# Patient Record
Sex: FEMALE | Race: WHITE | NOT HISPANIC OR LATINO | URBAN - METROPOLITAN AREA
[De-identification: names, ages, dates, MRNs, and addresses within clinical notes are randomized per-mention and may not be internally consistent; named-entity substitution may affect disease eponyms.]

---

## 2023-11-11 ENCOUNTER — EMERGENCY (EMERGENCY)
Facility: HOSPITAL | Age: 88
LOS: 1 days | Discharge: ANOTHER HOSPITAL | End: 2023-11-11
Attending: EMERGENCY MEDICINE
Payer: MEDICARE

## 2023-11-11 VITALS
OXYGEN SATURATION: 96 % | HEIGHT: 65 IN | DIASTOLIC BLOOD PRESSURE: 94 MMHG | TEMPERATURE: 98 F | RESPIRATION RATE: 16 BRPM | HEART RATE: 82 BPM | SYSTOLIC BLOOD PRESSURE: 215 MMHG | WEIGHT: 100.09 LBS

## 2023-11-11 LAB
ACETONE SERPL-MCNC: NEGATIVE — SIGNIFICANT CHANGE UP
ACETONE SERPL-MCNC: NEGATIVE — SIGNIFICANT CHANGE UP
ALBUMIN SERPL ELPH-MCNC: 3.1 G/DL — LOW (ref 3.5–5)
ALBUMIN SERPL ELPH-MCNC: 3.1 G/DL — LOW (ref 3.5–5)
ALP SERPL-CCNC: 84 U/L — SIGNIFICANT CHANGE UP (ref 40–120)
ALP SERPL-CCNC: 84 U/L — SIGNIFICANT CHANGE UP (ref 40–120)
ALT FLD-CCNC: 21 U/L DA — SIGNIFICANT CHANGE UP (ref 10–60)
ALT FLD-CCNC: 21 U/L DA — SIGNIFICANT CHANGE UP (ref 10–60)
ANION GAP SERPL CALC-SCNC: 5 MMOL/L — SIGNIFICANT CHANGE UP (ref 5–17)
ANION GAP SERPL CALC-SCNC: 5 MMOL/L — SIGNIFICANT CHANGE UP (ref 5–17)
APPEARANCE UR: CLEAR — SIGNIFICANT CHANGE UP
APPEARANCE UR: CLEAR — SIGNIFICANT CHANGE UP
AST SERPL-CCNC: 26 U/L — SIGNIFICANT CHANGE UP (ref 10–40)
AST SERPL-CCNC: 26 U/L — SIGNIFICANT CHANGE UP (ref 10–40)
BASOPHILS # BLD AUTO: 0.04 K/UL — SIGNIFICANT CHANGE UP (ref 0–0.2)
BASOPHILS # BLD AUTO: 0.04 K/UL — SIGNIFICANT CHANGE UP (ref 0–0.2)
BASOPHILS NFR BLD AUTO: 0.6 % — SIGNIFICANT CHANGE UP (ref 0–2)
BASOPHILS NFR BLD AUTO: 0.6 % — SIGNIFICANT CHANGE UP (ref 0–2)
BILIRUB SERPL-MCNC: 0.4 MG/DL — SIGNIFICANT CHANGE UP (ref 0.2–1.2)
BILIRUB SERPL-MCNC: 0.4 MG/DL — SIGNIFICANT CHANGE UP (ref 0.2–1.2)
BILIRUB UR-MCNC: NEGATIVE — SIGNIFICANT CHANGE UP
BILIRUB UR-MCNC: NEGATIVE — SIGNIFICANT CHANGE UP
BUN SERPL-MCNC: 34 MG/DL — HIGH (ref 7–18)
BUN SERPL-MCNC: 34 MG/DL — HIGH (ref 7–18)
CALCIUM SERPL-MCNC: 8.7 MG/DL — SIGNIFICANT CHANGE UP (ref 8.4–10.5)
CALCIUM SERPL-MCNC: 8.7 MG/DL — SIGNIFICANT CHANGE UP (ref 8.4–10.5)
CHLORIDE SERPL-SCNC: 105 MMOL/L — SIGNIFICANT CHANGE UP (ref 96–108)
CHLORIDE SERPL-SCNC: 105 MMOL/L — SIGNIFICANT CHANGE UP (ref 96–108)
CK SERPL-CCNC: 183 U/L — SIGNIFICANT CHANGE UP (ref 21–215)
CK SERPL-CCNC: 183 U/L — SIGNIFICANT CHANGE UP (ref 21–215)
CO2 SERPL-SCNC: 29 MMOL/L — SIGNIFICANT CHANGE UP (ref 22–31)
CO2 SERPL-SCNC: 29 MMOL/L — SIGNIFICANT CHANGE UP (ref 22–31)
COLOR SPEC: YELLOW — SIGNIFICANT CHANGE UP
COLOR SPEC: YELLOW — SIGNIFICANT CHANGE UP
CREAT SERPL-MCNC: 0.96 MG/DL — SIGNIFICANT CHANGE UP (ref 0.5–1.3)
CREAT SERPL-MCNC: 0.96 MG/DL — SIGNIFICANT CHANGE UP (ref 0.5–1.3)
DIFF PNL FLD: NEGATIVE — SIGNIFICANT CHANGE UP
DIFF PNL FLD: NEGATIVE — SIGNIFICANT CHANGE UP
EGFR: 54 ML/MIN/1.73M2 — LOW
EGFR: 54 ML/MIN/1.73M2 — LOW
EOSINOPHIL # BLD AUTO: 0.32 K/UL — SIGNIFICANT CHANGE UP (ref 0–0.5)
EOSINOPHIL # BLD AUTO: 0.32 K/UL — SIGNIFICANT CHANGE UP (ref 0–0.5)
EOSINOPHIL NFR BLD AUTO: 4.7 % — SIGNIFICANT CHANGE UP (ref 0–6)
EOSINOPHIL NFR BLD AUTO: 4.7 % — SIGNIFICANT CHANGE UP (ref 0–6)
GLUCOSE SERPL-MCNC: 105 MG/DL — HIGH (ref 70–99)
GLUCOSE SERPL-MCNC: 105 MG/DL — HIGH (ref 70–99)
GLUCOSE UR QL: NEGATIVE MG/DL — SIGNIFICANT CHANGE UP
GLUCOSE UR QL: NEGATIVE MG/DL — SIGNIFICANT CHANGE UP
HCT VFR BLD CALC: 36.7 % — SIGNIFICANT CHANGE UP (ref 34.5–45)
HCT VFR BLD CALC: 36.7 % — SIGNIFICANT CHANGE UP (ref 34.5–45)
HGB BLD-MCNC: 11.4 G/DL — LOW (ref 11.5–15.5)
HGB BLD-MCNC: 11.4 G/DL — LOW (ref 11.5–15.5)
IMM GRANULOCYTES NFR BLD AUTO: 0.3 % — SIGNIFICANT CHANGE UP (ref 0–0.9)
IMM GRANULOCYTES NFR BLD AUTO: 0.3 % — SIGNIFICANT CHANGE UP (ref 0–0.9)
KETONES UR-MCNC: NEGATIVE MG/DL — SIGNIFICANT CHANGE UP
KETONES UR-MCNC: NEGATIVE MG/DL — SIGNIFICANT CHANGE UP
LEUKOCYTE ESTERASE UR-ACNC: NEGATIVE — SIGNIFICANT CHANGE UP
LEUKOCYTE ESTERASE UR-ACNC: NEGATIVE — SIGNIFICANT CHANGE UP
LYMPHOCYTES # BLD AUTO: 0.9 K/UL — LOW (ref 1–3.3)
LYMPHOCYTES # BLD AUTO: 0.9 K/UL — LOW (ref 1–3.3)
LYMPHOCYTES # BLD AUTO: 13.2 % — SIGNIFICANT CHANGE UP (ref 13–44)
LYMPHOCYTES # BLD AUTO: 13.2 % — SIGNIFICANT CHANGE UP (ref 13–44)
MAGNESIUM SERPL-MCNC: 2.2 MG/DL — SIGNIFICANT CHANGE UP (ref 1.6–2.6)
MAGNESIUM SERPL-MCNC: 2.2 MG/DL — SIGNIFICANT CHANGE UP (ref 1.6–2.6)
MCHC RBC-ENTMCNC: 30.3 PG — SIGNIFICANT CHANGE UP (ref 27–34)
MCHC RBC-ENTMCNC: 30.3 PG — SIGNIFICANT CHANGE UP (ref 27–34)
MCHC RBC-ENTMCNC: 31.1 GM/DL — LOW (ref 32–36)
MCHC RBC-ENTMCNC: 31.1 GM/DL — LOW (ref 32–36)
MCV RBC AUTO: 97.6 FL — SIGNIFICANT CHANGE UP (ref 80–100)
MCV RBC AUTO: 97.6 FL — SIGNIFICANT CHANGE UP (ref 80–100)
MONOCYTES # BLD AUTO: 0.55 K/UL — SIGNIFICANT CHANGE UP (ref 0–0.9)
MONOCYTES # BLD AUTO: 0.55 K/UL — SIGNIFICANT CHANGE UP (ref 0–0.9)
MONOCYTES NFR BLD AUTO: 8.1 % — SIGNIFICANT CHANGE UP (ref 2–14)
MONOCYTES NFR BLD AUTO: 8.1 % — SIGNIFICANT CHANGE UP (ref 2–14)
NEUTROPHILS # BLD AUTO: 4.98 K/UL — SIGNIFICANT CHANGE UP (ref 1.8–7.4)
NEUTROPHILS # BLD AUTO: 4.98 K/UL — SIGNIFICANT CHANGE UP (ref 1.8–7.4)
NEUTROPHILS NFR BLD AUTO: 73.1 % — SIGNIFICANT CHANGE UP (ref 43–77)
NEUTROPHILS NFR BLD AUTO: 73.1 % — SIGNIFICANT CHANGE UP (ref 43–77)
NITRITE UR-MCNC: NEGATIVE — SIGNIFICANT CHANGE UP
NITRITE UR-MCNC: NEGATIVE — SIGNIFICANT CHANGE UP
NRBC # BLD: 0 /100 WBCS — SIGNIFICANT CHANGE UP (ref 0–0)
NRBC # BLD: 0 /100 WBCS — SIGNIFICANT CHANGE UP (ref 0–0)
NT-PROBNP SERPL-SCNC: 1923 PG/ML — HIGH (ref 0–450)
NT-PROBNP SERPL-SCNC: 1923 PG/ML — HIGH (ref 0–450)
OSMOLALITY SERPL: 307 MOSMOL/KG — HIGH (ref 280–301)
OSMOLALITY SERPL: 307 MOSMOL/KG — HIGH (ref 280–301)
PH UR: 6.5 — SIGNIFICANT CHANGE UP (ref 5–8)
PH UR: 6.5 — SIGNIFICANT CHANGE UP (ref 5–8)
PLATELET # BLD AUTO: 245 K/UL — SIGNIFICANT CHANGE UP (ref 150–400)
PLATELET # BLD AUTO: 245 K/UL — SIGNIFICANT CHANGE UP (ref 150–400)
POTASSIUM SERPL-MCNC: 4.3 MMOL/L — SIGNIFICANT CHANGE UP (ref 3.5–5.3)
POTASSIUM SERPL-MCNC: 4.3 MMOL/L — SIGNIFICANT CHANGE UP (ref 3.5–5.3)
POTASSIUM SERPL-SCNC: 4.3 MMOL/L — SIGNIFICANT CHANGE UP (ref 3.5–5.3)
POTASSIUM SERPL-SCNC: 4.3 MMOL/L — SIGNIFICANT CHANGE UP (ref 3.5–5.3)
PROT SERPL-MCNC: 8.3 G/DL — SIGNIFICANT CHANGE UP (ref 6–8.3)
PROT SERPL-MCNC: 8.3 G/DL — SIGNIFICANT CHANGE UP (ref 6–8.3)
PROT UR-MCNC: NEGATIVE MG/DL — SIGNIFICANT CHANGE UP
PROT UR-MCNC: NEGATIVE MG/DL — SIGNIFICANT CHANGE UP
RBC # BLD: 3.76 M/UL — LOW (ref 3.8–5.2)
RBC # BLD: 3.76 M/UL — LOW (ref 3.8–5.2)
RBC # FLD: 12.9 % — SIGNIFICANT CHANGE UP (ref 10.3–14.5)
RBC # FLD: 12.9 % — SIGNIFICANT CHANGE UP (ref 10.3–14.5)
SODIUM SERPL-SCNC: 139 MMOL/L — SIGNIFICANT CHANGE UP (ref 135–145)
SODIUM SERPL-SCNC: 139 MMOL/L — SIGNIFICANT CHANGE UP (ref 135–145)
SP GR SPEC: 1.01 — SIGNIFICANT CHANGE UP (ref 1–1.03)
SP GR SPEC: 1.01 — SIGNIFICANT CHANGE UP (ref 1–1.03)
TROPONIN I, HIGH SENSITIVITY RESULT: 28.6 NG/L — SIGNIFICANT CHANGE UP
TROPONIN I, HIGH SENSITIVITY RESULT: 28.6 NG/L — SIGNIFICANT CHANGE UP
UROBILINOGEN FLD QL: 0.2 MG/DL — SIGNIFICANT CHANGE UP (ref 0.2–1)
UROBILINOGEN FLD QL: 0.2 MG/DL — SIGNIFICANT CHANGE UP (ref 0.2–1)
WBC # BLD: 6.81 K/UL — SIGNIFICANT CHANGE UP (ref 3.8–10.5)
WBC # BLD: 6.81 K/UL — SIGNIFICANT CHANGE UP (ref 3.8–10.5)
WBC # FLD AUTO: 6.81 K/UL — SIGNIFICANT CHANGE UP (ref 3.8–10.5)
WBC # FLD AUTO: 6.81 K/UL — SIGNIFICANT CHANGE UP (ref 3.8–10.5)

## 2023-11-11 PROCEDURE — 99285 EMERGENCY DEPT VISIT HI MDM: CPT

## 2023-11-11 PROCEDURE — 71045 X-RAY EXAM CHEST 1 VIEW: CPT | Mod: 26

## 2023-11-11 RX ORDER — SODIUM CHLORIDE 9 MG/ML
1000 INJECTION INTRAMUSCULAR; INTRAVENOUS; SUBCUTANEOUS
Refills: 0 | Status: DISCONTINUED | OUTPATIENT
Start: 2023-11-11 | End: 2023-11-15

## 2023-11-11 RX ORDER — ACETAMINOPHEN 500 MG
675 TABLET ORAL ONCE
Refills: 0 | Status: COMPLETED | OUTPATIENT
Start: 2023-11-11 | End: 2023-11-11

## 2023-11-11 RX ADMIN — SODIUM CHLORIDE 150 MILLILITER(S): 9 INJECTION INTRAMUSCULAR; INTRAVENOUS; SUBCUTANEOUS at 19:53

## 2023-11-11 NOTE — ED PROVIDER NOTE - PROGRESS NOTE DETAILS
labs explained to pt & son.  case d/w Dr. Funes, if CT shows no fracture, advised to d/c home with son CT lumbar/bony pelvis pending  S.O. to Dr. GWENDOLYN Gonzalez Gonzalez: ct shows greater trochanteric fx not extending into intertrochanter area. will need mri after confirming with transfer center ortho- bedrest Gonzalez: unsure if MRi is available. will transfer to [facility] to have test performed.

## 2023-11-11 NOTE — ED PROVIDER NOTE - CARE PLAN
Principal Discharge DX:	Syncope  Secondary Diagnosis:	Dehydration  Secondary Diagnosis:	Back pain   1 Principal Discharge DX:	Syncope  Secondary Diagnosis:	Dehydration  Secondary Diagnosis:	Back pain  Secondary Diagnosis:	Hip fracture

## 2023-11-11 NOTE — ED PROVIDER NOTE - OBJECTIVE STATEMENT
Patient is a 89 y/o female with no pertinent PMHx or pSHX pressents to the ED c/o fall. Patient here with son who states that she fell yesterday when she was walking in the stair stein and missed the handgrip and fell on her back side. She crawled to her bed and her  helped her up. She was able to walk afterwords. This morning, per , patient was eating breakfast and sitting at the table when she seemed like she nodded off for a minute or two according to the . Patient woke up but then fell off her chair onto the floor head forward. Patient states hitting her head. She feels pain when sitting up to the left side of her hip and some pain to her lower back. Patient denies neck pain, belly pain, and all other acute complaints. Patient cannot ambulate by herself. Patient denies smoking and allergies.

## 2023-11-11 NOTE — ED ADULT NURSE NOTE - NSFALLHARMRISKINTERV_ED_ALL_ED
Assistance OOB with selected safe patient handling equipment if applicable/Assistance with ambulation/Communicate risk of Fall with Harm to all staff, patient, and family/Encourage patient to sit up slowly, dangle for a short time, stand at bedside before walking/Monitor gait and stability/Orthostatic vital signs/Provide patient with walking aids/Provide visual cue: red socks, yellow wristband, yellow gown, etc/Reinforce activity limits and safety measures with patient and family/Bed in lowest position, wheels locked, appropriate side rails in place/Call bell, personal items and telephone in reach/Instruct patient to call for assistance before getting out of bed/chair/stretcher/Non-slip footwear applied when patient is off stretcher/[place] to call system/Physically safe environment - no spills, clutter or unnecessary equipment/Purposeful Proactive Rounding/Room/bathroom lighting operational, light cord in reach

## 2023-11-11 NOTE — ED ADULT NURSE NOTE - NSFALLRISKFACTORS_ED_ALL_ED
BMI: BMI (kg/m2): 25.2 (10-04-22 @ 17:55)  HbA1c: A1C with Estimated Average Glucose Result: 5.1 % (07-06-22 @ 10:38)    Glucose: POCT Blood Glucose.: 112 mg/dL (10-05-22 @ 20:21)    BP: --  Lipid Panel: Date/Time: 07-06-22 @ 10:38  Cholesterol, Serum: 128  Direct LDL: --  HDL Cholesterol, Serum: 52  Total Cholesterol/HDL Ration Measurement: --  Triglycerides, Serum: 74   Age: 85 years old or older

## 2023-11-11 NOTE — ED PROVIDER NOTE - MUSCULOSKELETAL, MLM
Spine appears normal, range of motion is not limited, no muscle or joint tenderness, mid lower back-tenderness to palp., no deformity

## 2023-11-11 NOTE — ED PROVIDER NOTE - ENMT NEGATIVE STATEMENT, MLM
Addended by: SCOOTER DOMINGO on: 4/21/2017 04:45 PM     Modules accepted: Orders     Ears: no ear pain and no hearing problems. Nose: no nasal congestion and no nasal drainage. Mouth/Throat: no dysphagia, no hoarseness and no throat pain. Neck: no lumps, no pain, no stiffness and no swollen glands.

## 2023-11-11 NOTE — ED PROVIDER NOTE - NSFOLLOWUPINSTRUCTIONS_ED_ALL_ED_FT
Dehydration, Adult  Dehydration is a condition in which there is not enough water or other fluids in the body. This happens when a person loses more fluids than he or she takes in. Important organs, such as the kidneys, brain, and heart, cannot function without a proper amount of fluids. Any loss of fluids from the body can lead to dehydration.    Dehydration can be mild, moderate, or severe. It should be treated right away to prevent it from becoming severe.    What are the causes?  Dehydration may be caused by:  Conditions that cause loss of water or other fluids, such as diarrhea, vomiting, or sweating or urinating a lot.  Not drinking enough fluids, especially when you are ill or doing activities that require a lot of energy.  Other illnesses and conditions, such as fever or infection.  Certain medicines, such as medicines that remove excess fluid from the body (diuretics).  Lack of safe drinking water.  Not being able to get enough water and food.  What increases the risk?  The following factors may make you more likely to develop this condition:  Having a long-term (chronic) illness that has not been treated properly, such as diabetes, heart disease, or kidney disease.  Being 65 years of age or older.  Having a disability.  Living in a place that is high in altitude, where thinner, drier air causes more fluid loss.  Doing exercises that put stress on your body for a long time (endurance sports).  What are the signs or symptoms?  Symptoms of dehydration depend on how severe it is.    Mild or moderate dehydration    Thirst.  Dry lips or dry mouth.  Dizziness or light-headedness, especially when standing up from a seated position.  Muscle cramps.  Dark urine. Urine may be the color of tea.  Less urine or tears produced than usual.  Headache.  Severe dehydration    Changes in skin. Your skin may be cold and clammy, blotchy, or pale. Your skin also may not return to normal after being lightly pinched and released.  Little or no tears, urine, or sweat.  Changes in vital signs, such as rapid breathing and low blood pressure. Your pulse may be weak or may be faster than 100 beats a minute when you are sitting still.  Other changes, such as:  Feeling very thirsty.  Sunken eyes.  Cold hands and feet.  Confusion.  Being very tired (lethargic) or having trouble waking from sleep.  Short-term weight loss.  Loss of consciousness.  How is this diagnosed?  This condition is diagnosed based on your symptoms and a physical exam. You may have blood and urine tests to help confirm the diagnosis.    How is this treated?  Treatment for this condition depends on how severe it is. Treatment should be started right away. Do not wait until dehydration becomes severe. Severe dehydration is an emergency and needs to be treated in a hospital.  Mild or moderate dehydration can be treated at home. You may be asked to:  Drink more fluids.  Drink an oral rehydration solution (ORS). This drink helps restore proper amounts of fluids and salts and minerals in the blood (electrolytes).  Severe dehydration can be treated:  With IV fluids.  By correcting abnormal levels of electrolytes. This is often done by giving electrolytes through a tube that is passed through your nose and into your stomach (nasogastric tube, or NG tube).  By treating the underlying cause of dehydration.  Follow these instructions at home:  Oral rehydration solution    If told by your health care provider, drink an ORS:  Make an ORS by following instructions on the package.  Start by drinking small amounts, about ½ cup (120 mL) every 5–10 minutes.  Slowly increase how much you drink until you have taken the amount recommended by your health care provider.  Eating and drinking          Drink enough clear fluid to keep your urine pale yellow. If you were told to drink an ORS, finish the ORS first and then start slowly drinking other clear fluids. Drink fluids such as:  Water. Do not drink only water. Doing that can lead to hyponatremia, which is having too little salt (sodium) in the body.  Water from ice chips you suck on.  Fruit juice that you have added water to (diluted fruit juice).  Low-calorie sports drinks.  Eat foods that contain a healthy balance of electrolytes, such as bananas, oranges, potatoes, tomatoes, and spinach.  Do not drink alcohol.  Avoid the following:  Drinks that contain a lot of sugar. These include high-calorie sports drinks, fruit juice that is not diluted, and soda.  Caffeine.  Foods that are greasy or contain a lot of fat or sugar.  General instructions    Take over-the-counter and prescription medicines only as told by your health care provider.  Do not take sodium tablets. Doing that can lead to having too much sodium in the body (hypernatremia).  Return to your normal activities as told by your health care provider. Ask your health care provider what activities are safe for you.  Keep all follow-up visits as told by your health care provider. This is important.  Contact a health care provider if:  You have muscle cramps, pain, or discomfort, such as:  Pain in your abdomen and the pain gets worse or stays in one area (localizes).  Stiff neck.  You have a rash.  You are more irritable than usual.  You are sleepier or have a harder time waking than usual.  You feel weak or dizzy.  You feel very thirsty.  Get help right away if you have:  Any symptoms of severe dehydration.  Symptoms of vomiting, such as:  You cannot eat or drink without vomiting.  Vomiting gets worse or does not go away.  Vomit includes blood or green matter (bile).  Symptoms that get worse with treatment.  A fever.  A severe headache.  Problems with urination or bowel movements, such as:  Diarrhea that gets worse or does not go away.  Blood in your stool (feces). This may cause stool to look black and tarry.  Not urinating, or urinating only a small amount of very dark urine, within 6–8 hours.  Trouble breathing.  These symptoms may represent a serious problem that is an emergency. Do not wait to see if the symptoms will go away. Get medical help right away. Call your local emergency services (911 in the [place] Do not drive yourself to the hospital.    Summary  Dehydration is a condition in which there is not enough water or other fluids in the body. This happens when a person loses more fluids than he or she takes in.  Treatment for this condition depends on how severe it is. Treatment should be started right away. Do not wait until dehydration becomes severe.  Drink enough clear fluid to keep your urine pale yellow. If you were told to drink an oral rehydration solution (ORS), finish the ORS first and then start slowly drinking other clear fluids.  Take over-the-counter and prescription medicines only as told by your health care provider.  Get help right away if you have any symptoms of severe dehydration.  This information is not intended to replace advice given to you by your health care provider. Make sure you discuss any questions you have with your health care provider.      Syncope, Adult  Outline of the head showing blood vessels that supply the brain.  Syncope refers to a condition in which a person temporarily loses consciousness. Syncope may also be called fainting or passing out. It is caused by a sudden decrease in blood flow to the brain. This can happen for a variety of reasons.    Most causes of syncope are not dangerous. It can be triggered by things such as needle sticks, seeing blood, pain, or intense emotion. However, syncope can also be a sign of a serious medical problem, such as a heart abnormality. Other causes can include dehydration, migraines, or taking medicines that lower blood pressure. Your health care provider may do tests to find the reason why you are having syncope.    If you faint, get medical help right away. Call your local emergency services (911 in the [place]    Follow these instructions at home:  Pay attention to any changes in your symptoms. Take these actions to stay safe and to help relieve your symptoms:    Knowing when you may be about to faint    Signs that you may be about to faint include:  Feeling dizzy, weak, light-headed, or like the room is spinning.  Feeling nauseous.  Seeing spots or seeing all white or all black in your field of vision.  Having cold, clammy skin or feeling warm and sweaty.  Hearing ringing in the ears (tinnitus).  If you start to feel like you might faint, sit or lie down right away. If sitting, put your head down between your legs. If lying down, raise (elevate) your feet above the level of your heart.  Breathe deeply and steadily. Wait until all the symptoms have passed.  Have someone stay with you until you feel stable.  Medicines    Take over-the-counter and prescription medicines only as told by your health care provider.  If you are taking blood pressure or heart medicine, get up slowly and take several minutes to sit and then stand. This can reduce dizziness and decrease the risk of syncope.  Lifestyle    Do not drive, use machinery, or play sports until your health care provider says it is okay.  Do not drink alcohol.  Do not use any products that contain nicotine or tobacco. These products include cigarettes, chewing tobacco, and vaping devices, such as e-cigarettes. If you need help quitting, ask your health care provider.  Avoid hot tubs and saunas.  General instructions    Talk with your health care provider about your symptoms. You may need to have testing to understand the cause of your syncope.  Drink enough fluid to keep your urine pale yellow.  Avoid prolonged standing. If you must stand for a long time, do movements such as:  Moving your legs.  Crossing your legs.  Flexing and stretching your leg muscles.  Squatting.  Keep all follow-up visits. This is important.  Contact a health care provider if:  You have episodes of near fainting.  Get help right away if:  You faint.  You hit your head or are injured after fainting.  You have any of these symptoms that may indicate trouble with your heart:  Fast or irregular heartbeats (palpitations).  Unusual pain in your chest, abdomen, or back.  Shortness of breath.  You have a seizure.  You have a severe headache.  You are confused.  You have vision problems.  You have severe weakness or trouble walking.  You are bleeding from your mouth or rectum, or you have black or tarry stool.  These symptoms may represent a serious problem that is an emergency. Do not wait to see if your symptoms will go away. Get medical help right away. Call your local emergency services (911 in the [place] Do not drive yourself to the hospital.    Summary  Syncope refers to a condition in which a person temporarily loses consciousness. Syncope may also be called fainting or passing out. It is caused by a sudden decrease in blood flow to the brain.  Signs that you may be about to faint include dizziness, feeling light-headed, feeling nauseous, sudden vision changes, or cold, clammy skin.  Even though most causes of syncope are not dangerous, syncope can be a sign of a serious medical problem. Get help right away if you faint.  If you start to feel like you might faint, sit or lie down right away. If sitting, put your head down between your legs. If lying down, raise (elevate) your feet above the level of your heart.  This information is not intended to replace advice given to you by your health care provider. Make sure you discuss any questions you have with your health care provider.    take Tylenol 2 tabs 4 times a day as needed for pain  ice to area

## 2023-11-11 NOTE — ED ADULT TRIAGE NOTE - CHIEF COMPLAINT QUOTE
s/p fall yesterday landing on buttock c/o pain BIB son from Urgent Care, dizziness from sitting to standing position for few seconds, past out this morning

## 2023-11-11 NOTE — ED ADULT NURSE NOTE - OBJECTIVE STATEMENT
pt is a 89 y.o. female w/  c/o fall due to dizziness, pt is ax4, on room air, ambulatory w/ a rollator, pt takes a baby aspirin everyday, pt denies hitting head and LOC. no other concerns are noted.

## 2023-11-11 NOTE — ED PROVIDER NOTE - CLINICAL SUMMARY MEDICAL DECISION MAKING FREE TEXT BOX
Patient s/p fall slip and fall last night and today had an episode of syncope. Appears to be dehydrated. Will get labs and CT to rule out vertebral fracture or any head injuries and will give IV fluids and reassess. Patient s/p fall slip and fall last night and today had an episode of syncope, this mostly 2/2 dehydration. Pt appears to be dehydrated. Will get labs and CT to rule out vertebral fracture or any head injuries.  Pt unlikely with hip fracture, no deformity, will give IV fluids and reassess.

## 2023-11-11 NOTE — ED PROVIDER NOTE - HEME/LYMPH NEGATIVE STATEMENT, MLM
Review of Systems   Constitutional: Positive for fever. When she gets migraine   Respiratory: Positive for cough. Gastrointestinal: Positive for abdominal pain and nausea. Abdominal pain in area of surgery   Endocrine: Positive for hot flashes. Genitourinary: Positive for pelvic pain. Musculoskeletal: Positive for back pain. Skin:        Healing abdominal incisions   Neurological: Positive for headaches and seizures. At times   Psychiatric/Behavioral: Positive for sleep disturbance. All other systems reviewed and are negative. 2/20/2020  Staging comments: This was a small area of adenocarcinoma in situ. Cone biopsy revealed no residual disease. All margins clear. 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 Subsequent cold knife cone biopsy revealed no residual disease. All margins negative. Post Operative Changes: Good thus far. Discussed result with patients, and all questions were answered. Plan:    Patient was advised to avoid intercourse for another 2 weeks to allow complete healing. At that time she may resume full activities once again. Follow Up Instructions: 4 months. Continue activity restrictions for another 2 weeks.     Electronically signed by Ronit Herrera MD on 4/10/20 at 10:13 AM EDT no anemia, no easy bruising, no jaundice, no swollen lymph nodes.

## 2023-11-11 NOTE — ED PROVIDER NOTE - MUSCULOSKELETAL [+], MLM
Adderall IR 20mg Pending    Insurance response  Prescription Drug Insurance: [employer]  Notes: Prior authorization submitted - will update provider when decision has been made by insurance.          pain when sitting up to the left side of her hip and some pain to her lower back./BACK PAIN

## 2023-11-12 ENCOUNTER — INPATIENT (INPATIENT)
Facility: HOSPITAL | Age: 88
LOS: 5 days | Discharge: HOME HEALTH | End: 2023-11-18
Attending: HOSPITALIST | Admitting: HOSPITALIST
Payer: MEDICARE

## 2023-11-12 VITALS
HEART RATE: 74 BPM | TEMPERATURE: 99 F | HEIGHT: 65 IN | OXYGEN SATURATION: 97 % | SYSTOLIC BLOOD PRESSURE: 165 MMHG | DIASTOLIC BLOOD PRESSURE: 85 MMHG | RESPIRATION RATE: 18 BRPM

## 2023-11-12 VITALS
SYSTOLIC BLOOD PRESSURE: 143 MMHG | TEMPERATURE: 98 F | OXYGEN SATURATION: 97 % | DIASTOLIC BLOOD PRESSURE: 77 MMHG | HEART RATE: 80 BPM | RESPIRATION RATE: 18 BRPM

## 2023-11-12 DIAGNOSIS — S72.009A FRACTURE OF UNSPECIFIED PART OF NECK OF UNSPECIFIED FEMUR, INITIAL ENCOUNTER FOR CLOSED FRACTURE: ICD-10-CM

## 2023-11-12 PROCEDURE — 73552 X-RAY EXAM OF FEMUR 2/>: CPT | Mod: 26,LT

## 2023-11-12 PROCEDURE — 82962 GLUCOSE BLOOD TEST: CPT

## 2023-11-12 PROCEDURE — 85025 COMPLETE CBC W/AUTO DIFF WBC: CPT

## 2023-11-12 PROCEDURE — 72131 CT LUMBAR SPINE W/O DYE: CPT | Mod: MA

## 2023-11-12 PROCEDURE — 99223 1ST HOSP IP/OBS HIGH 75: CPT

## 2023-11-12 PROCEDURE — 36415 COLL VENOUS BLD VENIPUNCTURE: CPT

## 2023-11-12 PROCEDURE — 99285 EMERGENCY DEPT VISIT HI MDM: CPT | Mod: 25

## 2023-11-12 PROCEDURE — 99285 EMERGENCY DEPT VISIT HI MDM: CPT

## 2023-11-12 PROCEDURE — 83880 ASSAY OF NATRIURETIC PEPTIDE: CPT

## 2023-11-12 PROCEDURE — 81003 URINALYSIS AUTO W/O SCOPE: CPT

## 2023-11-12 PROCEDURE — 83930 ASSAY OF BLOOD OSMOLALITY: CPT

## 2023-11-12 PROCEDURE — 82009 KETONE BODYS QUAL: CPT

## 2023-11-12 PROCEDURE — 71045 X-RAY EXAM CHEST 1 VIEW: CPT

## 2023-11-12 PROCEDURE — 72131 CT LUMBAR SPINE W/O DYE: CPT | Mod: 26,MA

## 2023-11-12 PROCEDURE — 83735 ASSAY OF MAGNESIUM: CPT

## 2023-11-12 PROCEDURE — 72192 CT PELVIS W/O DYE: CPT | Mod: 26,MA

## 2023-11-12 PROCEDURE — 84484 ASSAY OF TROPONIN QUANT: CPT

## 2023-11-12 PROCEDURE — 96374 THER/PROPH/DIAG INJ IV PUSH: CPT

## 2023-11-12 PROCEDURE — 73721 MRI JNT OF LWR EXTRE W/O DYE: CPT | Mod: 26,LT

## 2023-11-12 PROCEDURE — 72192 CT PELVIS W/O DYE: CPT | Mod: MA

## 2023-11-12 PROCEDURE — 93005 ELECTROCARDIOGRAM TRACING: CPT

## 2023-11-12 PROCEDURE — 82550 ASSAY OF CK (CPK): CPT

## 2023-11-12 PROCEDURE — 72195 MRI PELVIS W/O DYE: CPT | Mod: 26,MA

## 2023-11-12 PROCEDURE — 73562 X-RAY EXAM OF KNEE 3: CPT | Mod: 26,LT

## 2023-11-12 PROCEDURE — 99222 1ST HOSP IP/OBS MODERATE 55: CPT | Mod: GC

## 2023-11-12 PROCEDURE — 80053 COMPREHEN METABOLIC PANEL: CPT

## 2023-11-12 PROCEDURE — 73502 X-RAY EXAM HIP UNI 2-3 VIEWS: CPT | Mod: 26,LT

## 2023-11-12 RX ORDER — MIDAZOLAM HYDROCHLORIDE 1 MG/ML
1 INJECTION, SOLUTION INTRAMUSCULAR; INTRAVENOUS ONCE
Refills: 0 | Status: COMPLETED | OUTPATIENT
Start: 2023-11-12 | End: 2023-11-12

## 2023-11-12 RX ORDER — SODIUM CHLORIDE 9 MG/ML
500 INJECTION INTRAMUSCULAR; INTRAVENOUS; SUBCUTANEOUS ONCE
Refills: 0 | Status: COMPLETED | OUTPATIENT
Start: 2023-11-12 | End: 2023-11-12

## 2023-11-12 RX ADMIN — SODIUM CHLORIDE 500 MILLILITER(S): 9 INJECTION INTRAMUSCULAR; INTRAVENOUS; SUBCUTANEOUS at 16:49

## 2023-11-12 RX ADMIN — Medication 270 MILLIGRAM(S): at 00:11

## 2023-11-12 RX ADMIN — Medication 675 MILLIGRAM(S): at 05:04

## 2023-11-12 NOTE — ED ADULT TRIAGE NOTE - PATIENT ON (OXYGEN DELIVERY METHOD)
"Please see \"Imaging\" tab under \"Chart Review\" for details of today's US.    Valentina Perera    " room air

## 2023-11-12 NOTE — CONSULT NOTE ADULT - ASSESSMENT
ASSESSMENT & PLAN  89yFemale w/ L non displaced comminuted GT fx on CT transferred to [facility] to r/o IT extension     PLAN  -NWB LLE, bedrest  -Please obtain XRs: pelvis, L hip, L femur, L knee  -Please obtain MRI pelvis and L hip (be sure MRI is to rule out IT extension)   -Pain control as needed   -hold chemical DVT ppx for OR; SCDs OK  -pain control  -ice/cold compress  -no ortho surgery indicated at this time  -ortho to follow and plan to  finalize pending imaging results  ASSESSMENT & PLAN  89yFemale w/ L non displaced comminuted GT fx on CT transferred to [facility] to r/o IT extension     PLAN  -NWB LLE, bedrest  -Please obtain XRs: pelvis, L hip, L femur, L knee  -Please obtain MRI pelvis and L hip (be sure MRI is to rule out IT extension)   -Pain control as needed   -hold chemical DVT ppx for possible OR; SCDs OK  -pain control  -ice/cold compress  -no ortho surgery indicated at this time  -ortho to follow and plan to  finalize pending imaging results

## 2023-11-12 NOTE — H&P ADULT - HISTORY OF PRESENT ILLNESS
89 yo f with listed h/o htn, thyroid disease . At time of my exam, pt sleeping, arousable, but does not want to participate in h&p. No one answering phone at  numbers listed in chart. Pt speaking lucidly, opening her eyes on command. Per ed/othro , pt sustained left intertrochanteric fracture. Seen by orthopedics here who upon reviewing CT and MR reading, are recommending bone scan in order to determine if pt requires surgery

## 2023-11-12 NOTE — H&P ADULT - NSHPPHYSICALEXAM_GEN_ALL_CORE
PHYSICAL EXAM:      Constitutional: NAD, well-groomed, well-developed  HEENT:  EOMI, Normal Hearing  Neck: No LAD, No JVD  Back: Normal spine flexure, No CVA tenderness  Respiratory: CTAB  Cardiovascular: S1 and S2, RRR, no M/G/R  Gastrointestinal: BS+, soft, NT/ND  Extremities: No peripheral edema  Vascular: 2+ peripheral pulses  Neurological: pt sleeping, arousable, but does not want to participate in h&p.Pt speaking lucidly, opening her eyes on command.

## 2023-11-12 NOTE — ED PROVIDER NOTE - CLINICAL SUMMARY MEDICAL DECISION MAKING FREE TEXT BOX
Mariel Christopher is a 89 y.o. F PMHx significant for HTN, hyperthyroid, takes daily baby aspirin, no other medications, who presents to the ED as a transfer [facility] on 11/10/23 for MRI.

## 2023-11-12 NOTE — H&P ADULT - NSHPLABSRESULTS_GEN_ALL_CORE
11.4   6.81  )-----------( 245      ( 2023 19:45 )             36.7         139  |  105  |  34<H>  ----------------------------<  105<H>  4.3   |  29  |  0.96    Ca    8.7      2023 19:45  Mg     2.2         TPro  8.3  /  Alb  3.1<L>  /  TBili  0.4  /  DBili  x   /  AST  26  /  ALT  21  /  AlkPhos  84      CAPILLARY BLOOD GLUCOSE          Urinalysis Basic - ( 2023 20:37 )    Color: Yellow / Appearance: Clear / S.014 / pH: x  Gluc: x / Ketone: Negative mg/dL  / Bili: Negative / Urobili: 0.2 mg/dL   Blood: x / Protein: Negative mg/dL / Nitrite: Negative   Leuk Esterase: Negative / RBC: x / WBC x   Sq Epi: x / Non Sq Epi: x / Bacteria: x      Vital Signs Last 24 Hrs  T(C): 36.9 (2023 21:35), Max: 37.1 (2023 07:02)  T(F): 98.4 (2023 21:35), Max: 98.7 (2023 07:02)  HR: 83 (2023 21:35) (73 - 109)  BP: 132/57 (2023 21:35) (132/57 - 189/70)  BP(mean): --  RR: 16 (2023 21:35) (15 - 18)  SpO2: 98% (2023 21:35) (96% - 100%)    Parameters below as of 2023 21:35  Patient On (Oxygen Delivery Method): room air

## 2023-11-12 NOTE — ED PROVIDER NOTE - OBJECTIVE STATEMENT
Mariel White is a 89 y.o. F PMHx significant for HTN, hyperthyroid, takes daily baby aspirin, no other medications, who presents to the ED as a transfer from [place] s/p mechanical fall on 11/10/23 for MRI.  Per patient, and son who accompanies her, Friday a.m. she had a mechanical fall after attempting to hold onto a stair rail.  Pt denies feeling lightheaded or dizzy prior to fall, denies LOC or trauma to the head at that time.  Patient was able to bend with assistance at that time.  The following day, as patient continued to ambulate, patient began experiencing pain localized to the lower back.  Per son, while sitting at a table Saturday morning, patient nodded off and fell off the chair onto her .  At that time, per , she was initially confused however, an hour later patient was back to baseline.  2/2 increasing pain with ambulation patient was taken to [place] where CT scans were obtained.  Iv Tylenol given at  for pain relief. Patient was transferred to [facility] for follow-up MRI study 2/2 uncertain intertrochanteric fracture.  At this time patient only complaint is of low back pain. Otherwise, patient denies HA, changes in vision, CP, SOB, ABD pain, changes in urination or BM, or other new myalgias or arthralgias other than lower back pain.    Of note, patient normally ambulates without assistance, she occasionally uses a cane however infrequently. At baseline she is AxOx3 though does intermittently forget details. Pt has ambulated with assistance today.

## 2023-11-12 NOTE — H&P ADULT - PROBLEM SELECTOR PLAN 1
-NWB LLE, bedrest  -Pain control as needed   -pain control (no pain meds given thus far in ed )  -ice/cold compress  -ortho to follow and plan to  finalize pending bone scan results  -npo save for sips of water/meds pending decision for surgery -NWB LLE, bedrest  -Pain control as needed   -pain control (no pain meds given thus far in ed )  -ice/cold compress  -ortho to follow and plan to  finalize pending bone scan results  -npo save for sips of water/meds pending decision for surgery    ADDEND  art 230am pt confused but calm and redirectable; reports "some kind of mistake that Im here". Unable to reach son John at this time; will reattempt later in am

## 2023-11-12 NOTE — ED PROVIDER NOTE - MUSCULOSKELETAL MINIMAL EXAM
tender to palpation over the lower back, No obvious signs of ecchymosis or erythema visible./normal range of motion

## 2023-11-12 NOTE — ED ADULT TRIAGE NOTE - CHIEF COMPLAINT QUOTE
Patient transfer from [place] for MRI of left hip, s/p fall yesterday. CT at [place] showed possible fracture. -head CT at . Arrives with 20G IV to left forearm. Offers no complaints. No hx.

## 2023-11-12 NOTE — ED ADULT NURSE NOTE - OBJECTIVE STATEMENT
Pt received to room 9. Pt A&O x3, slightly confused. ambulatory with assist. Pt c/o s/p fall on Friday. Pt is a transfer from [facility]. Pt had a CT at [facility] that may have shown a fracture. Pt transferred for MRI. Pt denies any pain. Pt denies fever, chills, headache, chest pain, SOB, N/V/D/C, or urinary symptoms. Neuro intact. PERRLA observed. Pt placed on cardiac monitor. Lung sounds clear bilaterally. S1 and S2 sounds noted. Abdomen soft, nontender, and nondistended. Comfort measures provided. Safety maintained.

## 2023-11-12 NOTE — ED PROVIDER NOTE - PROGRESS NOTE DETAILS
Attending MD Ramirez.  Pt signed out to me in stable condition pending L hip MRI, dispo per ortho, 89 yo fem with L hip fx, eval s/p MRI.

## 2023-11-12 NOTE — CONSULT NOTE ADULT - ATTENDING COMMENTS
Patient seen and examined. Mariel White is a 89 year old female who presented to the [facility] Emergency Department for evaluation of her left hip pain. Patient had a mechanical fall on 11/10/2023 after missing a hand railing. She was able to ambulate afterwards. On 11/11/2023, patient had an apparent syncopal episode and fell over onto her . Patient was able to get up and into the chair, but was somewhat disoriented. She was brought to an urgent care and was recommended to present to [facility] for evaluation. CT scan showed a comminuted left greater trochanter fracture. She was then transferred to [facility] for MRI evaluation to rule out IT extension. Orthopedics was consulted and MRI was recommended. However, there was significant motion artifact on the MRI.     Physical Exam:  Motor: Intact EHL/FHL/Tibialis Anterior/Gastrocnemius  Sensory: Intact Superficial Peroneal/Deep Peroneal/Saphenous/Sural/Tibial Nerves  Vascular: 2+ DP Pulse  No tenderness or pain with ROM over the bilateral upper extremities  No tenderness or pain with ROM over the right lower extremity  Mild posterolateral left hip pain with ROM. No groin pain. No pain with heel strike or log roll    Assessment/Plan:  Mariel White is a 89 year old female with a left greater trochanter fracture. Discussed with the patient and her son, the nature of greater trochanter fractures. Discussed the risk of IT extension and the management of IT extension. Discussed that operative management of IT extension would consist of Left Hip Open Reduction, Internal Fixation, Intramedullary Nail. Discussed that if there is no IT extension, then would manage nonoperatively. Discussed following up repeat MRI. Patient and her son understanding and in agreement with the plan.    Plan:  -Repeat MRI to evaluate for IT extension  -Left Lower Extremity: Non-weight bearing

## 2023-11-12 NOTE — CONSULT NOTE ADULT - SUBJECTIVE AND OBJECTIVE BOX
HPI  89y[facility] setting of r/o L IT fx.  Per pt and son she had am mechanical fall on Friday 11/10 from ground level after missing her hand railing.  She was on the ground for a short time and was able to get herself up and continued ambulating w mild discomfort  Saturday 11/11 morning pt had what was described as a potential syncopal episode at breakfast in which she appeared to fall asleep in her chair and then fell over to the floor.  Her son was called by her  and by the time he arrived ~ 1 hour later she was up and in her chair and back to baseline.  She was taken to  where labs were done and it was recommended [facility] to [facility] eval.  Throughout this whole period she was ambulating w mild to moderate reported discomfort  [facility] demonstrated comminuted non displaced L GT fx so pt transferred for MRI to r/o IT extension.  Denies headstrike or LOC. Denies numbness/tingling in the LLE. Denies any other trauma/injuries at this time. At baseline, community ambulator w/o assistive devices and independent in ADLs    ROS  Negative unless otherwise specified in HPI.    PAST MEDICAL & SURGICAL Hx  PAST MEDICAL & SURGICAL HISTORY:  HTN (hypertension)      H/O hyperthyroidism      No significant past surgical history          MEDICATIONS  Home Medications:      ALLERGIES  No Known Allergies      FAMILY Hx  FAMILY HISTORY:      SOCIAL Hx  Social History:      VITALS  Vital Signs Last 24 Hrs  T(C): 36.8 (12 Nov 2023 07:21), Max: 37.1 (12 Nov 2023 07:02)  T(F): 98.2 (12 Nov 2023 07:21), Max: 98.7 (12 Nov 2023 07:02)  HR: 73 (12 Nov 2023 07:21) (73 - 109)  BP: 186/81 (12 Nov 2023 07:21) (143/77 - 215/94)  BP(mean): --  RR: 15 (12 Nov 2023 07:21) (15 - 18)  SpO2: 100% (12 Nov 2023 07:21) (96% - 100%)    Parameters below as of 12 Nov 2023 07:21  Patient On (Oxygen Delivery Method): room air        PHYSICAL EXAM  Gen: Lying in bed, NAD  Resp: No increased WOB  LLE:  Skin intact, equal leg lengths, no edema or ecchymoses over R or L hip  Erythema/scaling of b/l distal LEs possibly venous stasis   +TTP over L hip localized to lateral aspect over GT and slightly posterior over glute, no TTP along remainder of extremity; compartments soft  Full AROM and PROM of the L hip   - Log roll  - pain w axial loading   Motor: TA/EHL/GS/FHL intact  Sensory: DP/SP/Tib/Sandip/Saph SILT  +DP pulse, WWP    Secondary survey:  No TTP along spine or other extremities, pelvis grossly stable, SILT and compartments soft throughout    LABS                        11.4   6.81  )-----------( 245      ( 11 Nov 2023 19:45 )             36.7     11-11    139  |  105  |  34<H>  ----------------------------<  105<H>  4.3   |  29  |  0.96    Ca    8.7      11 Nov 2023 19:45  Mg     2.2     11-11    TPro  8.3  /  Alb  3.1<L>  /  TBili  0.4  /  DBili  x   /  AST  26  /  ALT  21  /  AlkPhos  84  11-11        IMAGING  CT showing L GT comminuted non displaced fx

## 2023-11-12 NOTE — ED ADULT NURSE NOTE - CHIEF COMPLAINT QUOTE
Patient transfer from [place] for MRI of left hip, s/p fall yesterday. CT at [place] showed possible fracture. -head CT at . Arrives with 20G IV to left forearm. Offers no complaints. No hx.

## 2023-11-13 DIAGNOSIS — E07.89 OTHER SPECIFIED DISORDERS OF THYROID: ICD-10-CM

## 2023-11-13 DIAGNOSIS — S72.009A FRACTURE OF UNSPECIFIED PART OF NECK OF UNSPECIFIED FEMUR, INITIAL ENCOUNTER FOR CLOSED FRACTURE: ICD-10-CM

## 2023-11-13 DIAGNOSIS — I10 ESSENTIAL (PRIMARY) HYPERTENSION: ICD-10-CM

## 2023-11-13 DIAGNOSIS — Z29.9 ENCOUNTER FOR PROPHYLACTIC MEASURES, UNSPECIFIED: ICD-10-CM

## 2023-11-13 DIAGNOSIS — Z79.899 OTHER LONG TERM (CURRENT) DRUG THERAPY: ICD-10-CM

## 2023-11-13 LAB
ANION GAP SERPL CALC-SCNC: 8 MMOL/L — SIGNIFICANT CHANGE UP (ref 7–14)
ANION GAP SERPL CALC-SCNC: 8 MMOL/L — SIGNIFICANT CHANGE UP (ref 7–14)
APTT BLD: 22.2 SEC — LOW (ref 24.5–35.6)
APTT BLD: 22.2 SEC — LOW (ref 24.5–35.6)
BLD GP AB SCN SERPL QL: NEGATIVE — SIGNIFICANT CHANGE UP
BLD GP AB SCN SERPL QL: NEGATIVE — SIGNIFICANT CHANGE UP
BUN SERPL-MCNC: 28 MG/DL — HIGH (ref 7–23)
BUN SERPL-MCNC: 28 MG/DL — HIGH (ref 7–23)
CALCIUM SERPL-MCNC: 9 MG/DL — SIGNIFICANT CHANGE UP (ref 8.4–10.5)
CALCIUM SERPL-MCNC: 9 MG/DL — SIGNIFICANT CHANGE UP (ref 8.4–10.5)
CHLORIDE SERPL-SCNC: 107 MMOL/L — SIGNIFICANT CHANGE UP (ref 98–107)
CHLORIDE SERPL-SCNC: 107 MMOL/L — SIGNIFICANT CHANGE UP (ref 98–107)
CO2 SERPL-SCNC: 24 MMOL/L — SIGNIFICANT CHANGE UP (ref 22–31)
CO2 SERPL-SCNC: 24 MMOL/L — SIGNIFICANT CHANGE UP (ref 22–31)
CREAT SERPL-MCNC: 1.01 MG/DL — SIGNIFICANT CHANGE UP (ref 0.5–1.3)
CREAT SERPL-MCNC: 1.01 MG/DL — SIGNIFICANT CHANGE UP (ref 0.5–1.3)
EGFR: 51 ML/MIN/1.73M2 — LOW
EGFR: 51 ML/MIN/1.73M2 — LOW
GLUCOSE SERPL-MCNC: 103 MG/DL — HIGH (ref 70–99)
GLUCOSE SERPL-MCNC: 103 MG/DL — HIGH (ref 70–99)
HCT VFR BLD CALC: 37 % — SIGNIFICANT CHANGE UP (ref 34.5–45)
HCT VFR BLD CALC: 37 % — SIGNIFICANT CHANGE UP (ref 34.5–45)
HGB BLD-MCNC: 11.2 G/DL — LOW (ref 11.5–15.5)
HGB BLD-MCNC: 11.2 G/DL — LOW (ref 11.5–15.5)
INR BLD: 1.24 RATIO — HIGH (ref 0.85–1.18)
INR BLD: 1.24 RATIO — HIGH (ref 0.85–1.18)
MCHC RBC-ENTMCNC: 29.4 PG — SIGNIFICANT CHANGE UP (ref 27–34)
MCHC RBC-ENTMCNC: 29.4 PG — SIGNIFICANT CHANGE UP (ref 27–34)
MCHC RBC-ENTMCNC: 30.3 GM/DL — LOW (ref 32–36)
MCHC RBC-ENTMCNC: 30.3 GM/DL — LOW (ref 32–36)
MCV RBC AUTO: 97.1 FL — SIGNIFICANT CHANGE UP (ref 80–100)
MCV RBC AUTO: 97.1 FL — SIGNIFICANT CHANGE UP (ref 80–100)
NRBC # BLD: 0 /100 WBCS — SIGNIFICANT CHANGE UP (ref 0–0)
NRBC # BLD: 0 /100 WBCS — SIGNIFICANT CHANGE UP (ref 0–0)
NRBC # FLD: 0 K/UL — SIGNIFICANT CHANGE UP (ref 0–0)
NRBC # FLD: 0 K/UL — SIGNIFICANT CHANGE UP (ref 0–0)
PLATELET # BLD AUTO: 172 K/UL — SIGNIFICANT CHANGE UP (ref 150–400)
PLATELET # BLD AUTO: 172 K/UL — SIGNIFICANT CHANGE UP (ref 150–400)
POTASSIUM SERPL-MCNC: 4.1 MMOL/L — SIGNIFICANT CHANGE UP (ref 3.5–5.3)
POTASSIUM SERPL-MCNC: 4.1 MMOL/L — SIGNIFICANT CHANGE UP (ref 3.5–5.3)
POTASSIUM SERPL-SCNC: 4.1 MMOL/L — SIGNIFICANT CHANGE UP (ref 3.5–5.3)
POTASSIUM SERPL-SCNC: 4.1 MMOL/L — SIGNIFICANT CHANGE UP (ref 3.5–5.3)
PROTHROM AB SERPL-ACNC: 13.9 SEC — HIGH (ref 9.5–13)
PROTHROM AB SERPL-ACNC: 13.9 SEC — HIGH (ref 9.5–13)
RBC # BLD: 3.81 M/UL — SIGNIFICANT CHANGE UP (ref 3.8–5.2)
RBC # BLD: 3.81 M/UL — SIGNIFICANT CHANGE UP (ref 3.8–5.2)
RBC # FLD: 13.2 % — SIGNIFICANT CHANGE UP (ref 10.3–14.5)
RBC # FLD: 13.2 % — SIGNIFICANT CHANGE UP (ref 10.3–14.5)
RH IG SCN BLD-IMP: POSITIVE — SIGNIFICANT CHANGE UP
RH IG SCN BLD-IMP: POSITIVE — SIGNIFICANT CHANGE UP
SODIUM SERPL-SCNC: 139 MMOL/L — SIGNIFICANT CHANGE UP (ref 135–145)
SODIUM SERPL-SCNC: 139 MMOL/L — SIGNIFICANT CHANGE UP (ref 135–145)
TSH SERPL-MCNC: 0.43 UIU/ML — SIGNIFICANT CHANGE UP (ref 0.27–4.2)
TSH SERPL-MCNC: 0.43 UIU/ML — SIGNIFICANT CHANGE UP (ref 0.27–4.2)
WBC # BLD: 5.45 K/UL — SIGNIFICANT CHANGE UP (ref 3.8–10.5)
WBC # BLD: 5.45 K/UL — SIGNIFICANT CHANGE UP (ref 3.8–10.5)
WBC # FLD AUTO: 5.45 K/UL — SIGNIFICANT CHANGE UP (ref 3.8–10.5)
WBC # FLD AUTO: 5.45 K/UL — SIGNIFICANT CHANGE UP (ref 3.8–10.5)

## 2023-11-13 PROCEDURE — 99233 SBSQ HOSP IP/OBS HIGH 50: CPT

## 2023-11-13 RX ORDER — KETOROLAC TROMETHAMINE 30 MG/ML
15 SYRINGE (ML) INJECTION EVERY 6 HOURS
Refills: 0 | Status: DISCONTINUED | OUTPATIENT
Start: 2023-11-13 | End: 2023-11-13

## 2023-11-13 RX ORDER — INFLUENZA VIRUS VACCINE 15; 15; 15; 15 UG/.5ML; UG/.5ML; UG/.5ML; UG/.5ML
0.7 SUSPENSION INTRAMUSCULAR ONCE
Refills: 0 | Status: DISCONTINUED | OUTPATIENT
Start: 2023-11-13 | End: 2023-11-18

## 2023-11-13 RX ORDER — HEPARIN SODIUM 5000 [USP'U]/ML
5000 INJECTION INTRAVENOUS; SUBCUTANEOUS EVERY 12 HOURS
Refills: 0 | Status: DISCONTINUED | OUTPATIENT
Start: 2023-11-13 | End: 2023-11-14

## 2023-11-13 RX ORDER — SODIUM CHLORIDE 9 MG/ML
1000 INJECTION INTRAMUSCULAR; INTRAVENOUS; SUBCUTANEOUS
Refills: 0 | Status: DISCONTINUED | OUTPATIENT
Start: 2023-11-13 | End: 2023-11-13

## 2023-11-13 RX ORDER — HEPARIN SODIUM 5000 [USP'U]/ML
5000 INJECTION INTRAVENOUS; SUBCUTANEOUS ONCE
Refills: 0 | Status: COMPLETED | OUTPATIENT
Start: 2023-11-13 | End: 2023-11-13

## 2023-11-13 RX ORDER — CHLORHEXIDINE GLUCONATE 213 G/1000ML
1 SOLUTION TOPICAL DAILY
Refills: 0 | Status: DISCONTINUED | OUTPATIENT
Start: 2023-11-13 | End: 2023-11-18

## 2023-11-13 RX ORDER — ACETAMINOPHEN 500 MG
650 TABLET ORAL EVERY 6 HOURS
Refills: 0 | Status: DISCONTINUED | OUTPATIENT
Start: 2023-11-13 | End: 2023-11-18

## 2023-11-13 RX ORDER — DIAZEPAM 5 MG
2.5 TABLET ORAL ONCE
Refills: 0 | Status: DISCONTINUED | OUTPATIENT
Start: 2023-11-13 | End: 2023-11-13

## 2023-11-13 RX ORDER — AMLODIPINE BESYLATE 2.5 MG/1
5 TABLET ORAL DAILY
Refills: 0 | Status: DISCONTINUED | OUTPATIENT
Start: 2023-11-13 | End: 2023-11-18

## 2023-11-13 RX ADMIN — SODIUM CHLORIDE 50 MILLILITER(S): 9 INJECTION INTRAMUSCULAR; INTRAVENOUS; SUBCUTANEOUS at 02:48

## 2023-11-13 RX ADMIN — HEPARIN SODIUM 5000 UNIT(S): 5000 INJECTION INTRAVENOUS; SUBCUTANEOUS at 02:05

## 2023-11-13 RX ADMIN — HEPARIN SODIUM 5000 UNIT(S): 5000 INJECTION INTRAVENOUS; SUBCUTANEOUS at 18:06

## 2023-11-13 RX ADMIN — AMLODIPINE BESYLATE 5 MILLIGRAM(S): 2.5 TABLET ORAL at 12:42

## 2023-11-13 RX ADMIN — Medication 2.5 MILLIGRAM(S): at 20:01

## 2023-11-13 NOTE — PROGRESS NOTE ADULT - PROBLEM SELECTOR PLAN 2
normotensive initially, now elevated to 170-180's systolic to 80-90s diastolic  not on BP meds at home per son  start norvasc 5 mg qd  cardiology consulted, no further testing prior to planned surgery

## 2023-11-13 NOTE — PATIENT PROFILE ADULT - FALL HARM RISK - HARM RISK INTERVENTIONS
Assistance with ambulation/Assistance OOB with selected safe patient handling equipment/Communicate Risk of Fall with Harm to all staff/Discuss with provider need for PT consult/Monitor gait and stability/Provide patient with walking aids - walker, cane, crutches/Reinforce activity limits and safety measures with patient and family/Review medications for side effects contributing to fall risk/Sit up slowly, dangle for a short time, stand at bedside before walking/Tailored Fall Risk Interventions/Use of alarms - bed, chair and/or voice tab/Visual Cue: Yellow wristband and red socks/Bed in lowest position, wheels locked, appropriate side rails in place/Call bell, personal items and telephone in reach/Instruct patient to call for assistance before getting out of bed or chair/Non-slip footwear when patient is out of bed/[place] to call system/Physically safe environment - no spills, clutter or unnecessary equipment/Purposeful Proactive Rounding/Room/bathroom lighting operational, light cord in reach Assistance with ambulation/Assistance OOB with selected safe patient handling equipment/Communicate Risk of Fall with Harm to all staff/Discuss with provider need for PT consult/Monitor gait and stability/Provide patient with walking aids - walker, cane, crutches/Reinforce activity limits and safety measures with patient and family/Tailored Fall Risk Interventions/Use of alarms - bed, chair and/or voice tab/Visual Cue: Yellow wristband and red socks/Bed in lowest position, wheels locked, appropriate side rails in place/Call bell, personal items and telephone in reach/Instruct patient to call for assistance before getting out of bed or chair/Non-slip footwear when patient is out of bed/[place] to call system/Physically safe environment - no spills, clutter or unnecessary equipment/Purposeful Proactive Rounding/Room/bathroom lighting operational, light cord in reach

## 2023-11-13 NOTE — CONSULT NOTE ADULT - SUBJECTIVE AND OBJECTIVE BOX
MR:- [id number] :  NAME:LAKEISHA JOHNSON:-    DATE OF SERVICE:11-13-23 @ 08:43    Patient was seen,examined and evaluated  by Otf Reyes MD [id number] @ 08:43 .  ER evaluation, Labs and Hospital course was reviewed,    CHIEF COMPLAINT:Fall    HPI 89 y.o. F PMHx significant for HTN, hyperthyroid, takes daily baby aspirin, no other medications, who presents to the ED as a transfer from [place] s/p mechanical fall on 11/10/23 for MRI.    Per patient, and son who accompanies her, Friday a.m. she had a mechanical fall after attempting to hold onto a stair rail.  Pt denies feeling lightheaded or dizzy prior to fall, denies LOC or trauma to the head at that time.  Patient was able to bend with assistance at that time.  The following day, as patient continued to ambulate, patient began experiencing pain localized to the lower back.  Per son, while sitting at a table Saturday morning, patient nodded off and fell off the chair onto her .  At that time, per , she was initially confused however, an hour later patient was back to baseline.  2/2 increasing pain with ambulation patient was taken to [place] where CT scans were obtained.  Iv Tylenol given at  for pain relief. Patient was transferred to [facility] for follow-up MRI study 2/2 uncertain intertrochanteric fracture.    At this time patient only complaint is of low back pain. Otherwise, patient denies HA, changes in vision, CP, SOB, ABD pain            CARDIAC HISTORY:  [ ] CAD [ [PCI [ ] CABG [ ] Prior Cath  [ ] Atrial Fibrillation  Devices[ ] PPM [ ] ICD [ ]ILR  Heart Failure [ ] HFrEF [ ] HFpEF    PAST MEDICAL & SURGICAL HISTORY:  HTN (hypertension)      H/O hyperthyroidism      No significant past surgical history          MEDICATIONS  (STANDING):  sodium chloride 0.9%. 1000 milliLiter(s) (50 mL/Hr) IV Continuous <Continuous>    MEDICATIONS  (PRN):  acetaminophen     Tablet .. 650 milliGRAM(s) Oral every 6 hours PRN Mild Pain (1 - 3), Moderate Pain (4 - 6)  ketorolac   Injectable 15 milliGRAM(s) IV Push every 6 hours PRN Severe Pain (7 - 10)      FAMILY HISTORY:    No family history of premature coronary artery disease or sudden cardiac death    SOCIAL HISTORY:  Smoking-[ ] Active  [ ] Former [ ] Non Smoker  Alcohol-[ ] Denies [ ] Social [ ] Daily  Ilicit Drug use-[ ] Denies [ ] Active user    REVIEW OF SYSTEMS:  Constitutional: [ ] fever, [ ]weight loss, [ ]fatigue   Activity [ ] Bedbound,[ ] Ambulates [ ] Unassisted[ ] Cane/Walker [ ] Assistence.  Effort tolerance:[ ] Excellent [ ] Good [ ] Fair [ ] Poor [ ]  Eyes: [ ] visual changes  Respiratory: [ ]shortness of breath;  [ ] cough, [ ]wheezing, [ ]chills, [ ]hemoptysis  Cardiovascular: [ ] chest pain, [ ]palpitations, [ ]dizziness,  [ ]leg swelling[ ]orthopnea [ ]PND  Gastrointestinal: [ ] abdominal pain, [ ]nausea, [ ]vomiting,  [ ]diarrhea,[ ]constipation  Genitourinary: [ ] dysuria, [ ] hematuria  Neurologic: [ ] headaches [ ] tremors[ ] weakness  Skin: [ ] itching, [ ]burning, [ ] rashes  Endocrine: [ ] heat or cold intolerance  Musculoskeletal: [ ] joint pain or swelling; [ ] muscle, back, or extremity pain  Psychiatric: [ ] depression, [ ]anxiety, [ ]mood swings, or [ ]difficulty sleeping  Hematologic: [ ] easy bruising, [ ] bleeding gums       [ x] All others negative	  [ ] Unable to obtain    Vital Signs Last 24 Hrs  T(C): 36.6 (13 Nov 2023 06:56), Max: 36.9 (12 Nov 2023 21:35)  T(F): 97.9 (13 Nov 2023 06:56), Max: 98.4 (12 Nov 2023 21:35)  HR: 79 (13 Nov 2023 06:56) (75 - 85)  BP: 184/90 (13 Nov 2023 06:56) (132/57 - 189/70)  BP(mean): --  RR: 16 (13 Nov 2023 06:56) (16 - 16)  SpO2: 99% (13 Nov 2023 06:56) (98% - 100%)    Parameters below as of 13 Nov 2023 06:56  Patient On (Oxygen Delivery Method): room air      I&O's Summary      PHYSICAL EXAM:  General: No acute distress BMI-  HEENT: EOMI, PERRL[ ] Icteric  Neck: Supple, [ ] JVD  Lungs: Equal air entry bilaterally; [ ] Rales [ ] Rhonchi [ ] Wheezing  Heart: Regular rate and rhythm;[ ] Murmurs-   /6 [ ] Systolic [ ] Diastolic [ ] Radiation,No rubs, or gallops  Abdomen: Nontender, bowel sounds present  Extremities: No clubbing, cyanosis, [ ] edema[ ] Calf tenderness  Nervous system:  Alert & Oriented X3, no focal deficits  Psychiatric: Normal affect  Skin: No rashes or lesions      LABS:  11-13    139  |  107  |  28<H>  ----------------------------<  103<H>  4.1   |  24  |  1.01    Ca    9.0      13 Nov 2023 02:03  Mg     2.2     11-11    TPro  8.3  /  Alb  3.1<L>  /  TBili  0.4  /  DBili  x   /  AST  26  /  ALT  21  /  AlkPhos  84  11-11    Creatinine Trend: 1.01<--, 0.96<--                        11.2   5.45  )-----------( 172      ( 13 Nov 2023 02:03 )             37.0     PT/INR - ( 13 Nov 2023 02:03 )   PT: 13.9 sec;   INR: 1.24 ratio         PTT - ( 13 Nov 2023 02:03 )  PTT:22.2 sec    Lipid Panel:   Cardiac Enzymes: CARDIAC MARKERS ( 11 Nov 2023 19:45 )  x     / x     / 183 U/L / x     / x                RADIOLOGY:    ECG [my interpretation]:    TELEMETRY:    ECHO:    STRESS TEST:    CATHETERIZATION: MR:- [id number] :  NAME:LAKEISHA JOHNSON:-    DATE OF SERVICE:11-13-23 @ 08:43    Patient was seen,examined and evaluated  by Otf Reyes MD [id number] @ 08:43 .  ER evaluation, Labs and Hospital course was reviewed,    CHIEF COMPLAINT:Fall    HPI 89 y.o. F PMHx significant for HTN, hyperthyroid, takes daily baby aspirin, no other medications, who presents to the ED as a transfer from [place] s/p mechanical fall on 11/10/23 for MRI.    Per patient, and son who accompanies her, Friday a.m. she had a mechanical fall after attempting to hold onto a stair rail.  Pt denies feeling lightheaded or dizzy prior to fall, denies LOC or trauma to the head at that time.  Patient was able to bend with assistance at that time.  The following day, as patient continued to ambulate, patient began experiencing pain localized to the lower back.  Per son, while sitting at a table Saturday morning, patient nodded off and fell off the chair onto her .  At that time, per , she was initially confused however, an hour later patient was back to baseline.  2/2 increasing pain with ambulation patient was taken to [place] where CT scans were obtained.  Iv Tylenol given at  for pain relief. Patient was transferred to [facility] for follow-up MRI study 2/2 uncertain intertrochanteric fracture.    At this time patient only complaint is of low back pain. Otherwise, patient denies HA, changes in vision, CP, SOB, ABD pain      Patient denies chest pain dyspnea no recent cardiac issues      CARDIAC HISTORY:  [ ] CAD [ [PCI [ ] CABG [ ] Prior Cath  [ ] Atrial Fibrillation  Devices[ ] PPM [ ] ICD [ ]ILR  Heart Failure [ ] HFrEF [ ] HFpEF    PAST MEDICAL & SURGICAL HISTORY:  HTN (hypertension)      H/O hyperthyroidism      No significant past surgical history          MEDICATIONS  (STANDING):  sodium chloride 0.9%. 1000 milliLiter(s) (50 mL/Hr) IV Continuous <Continuous>    MEDICATIONS  (PRN):  acetaminophen     Tablet .. 650 milliGRAM(s) Oral every 6 hours PRN Mild Pain (1 - 3), Moderate Pain (4 - 6)  ketorolac   Injectable 15 milliGRAM(s) IV Push every 6 hours PRN Severe Pain (7 - 10)      FAMILY HISTORY:    No family history of premature coronary artery disease or sudden cardiac death    SOCIAL HISTORY:  Smoking-[ ] Active  [ ] Former [ ] Non Smoker  Alcohol-[ ] Denies [ ] Social [ ] Daily  Ilicit Drug use-[ ] Denies [ ] Active user    REVIEW OF SYSTEMS:  Constitutional: [ ] fever, [ ]weight loss, [x ]fatigue   Activity [ ] Bedbound,[x ] Ambulates [x ] Unassisted[ ] Cane/Walker [ ] Assistence.  Effort tolerance:[ ] Excellent [ ] Good [ ] Fair [ x] Poor [ ]  Eyes: [ ] visual changes  Respiratory: [ ]shortness of breath;  [ ] cough, [ ]wheezing, [ ]chills, [ ]hemoptysis  Cardiovascular: [ ] chest pain, [ ]palpitations, [ ]dizziness,  [ ]leg swelling[ ]orthopnea [ ]PND  Gastrointestinal: [ ] abdominal pain, [ ]nausea, [ ]vomiting,  [ ]diarrhea,[ ]constipation  Genitourinary: [ ] dysuria, [ ] hematuria  Neurologic: [ ] headaches [ ] tremors[ ] weakness  Skin: [ ] itching, [ ]burning, [ ] rashes  Endocrine: [ ] heat or cold intolerance  Musculoskeletal: [x ] joint pain or swelling; [ ] muscle, back, or extremity pain  Psychiatric: [ ] depression, [ ]anxiety, [ ]mood swings, or [ ]difficulty sleeping  Hematologic: [ ] easy bruising, [ ] bleeding gums       [ x] All others negative	  [ ] Unable to obtain    Vital Signs Last 24 Hrs  T(C): 36.6 (13 Nov 2023 06:56), Max: 36.9 (12 Nov 2023 21:35)  T(F): 97.9 (13 Nov 2023 06:56), Max: 98.4 (12 Nov 2023 21:35)  HR: 79 (13 Nov 2023 06:56) (75 - 85)  BP: 184/90 (13 Nov 2023 06:56) (132/57 - 189/70)  RR: 16 (13 Nov 2023 06:56) (16 - 16)  SpO2: 99% (13 Nov 2023 06:56) (98% - 100%)    Parameters below as of 13 Nov 2023 06:56  Patient On (Oxygen Delivery Method): room air      I&O's Summary      PHYSICAL EXAM:  General: No acute distress BMI-28  HEENT: EOMI, PERRL[ ] Icteric  Neck: Supple, [ ] JVD  Lungs: Equal air entry bilaterally; [ ] Rales [ ] Rhonchi [ ] Wheezing  Heart: Regular rate and rhythm;[x ] Murmurs-  2 /6 [x ] Systolic [ ] Diastolic [ ] Radiation,No rubs, or gallops  Abdomen: Nontender, bowel sounds present  Extremities: No clubbing, cyanosis, [ ] edema[ ] Calf tenderness  Nervous system:  Alert & Oriented X3, no focal deficits  Psychiatric: Normal affect  Skin: No rashes or lesions      LABS:  11-13    139  |  107  |  28<H>  ----------------------------<  103<H>  4.1   |  24  |  1.01    Ca    9.0      13 Nov 2023 02:03  Mg     2.2     11-11    TPro  8.3  /  Alb  3.1<L>  /  TBili  0.4  /  DBili  x   /  AST  26  /  ALT  21  /  AlkPhos  84  11-11    Creatinine Trend: 1.01<--, 0.96<--                        11.2   5.45  )-----------( 172      ( 13 Nov 2023 02:03 )             37.0     PT/INR - ( 13 Nov 2023 02:03 )   PT: 13.9 sec;   INR: 1.24 ratio    PTT - ( 13 Nov 2023 02:03 )  PTT:22.2 sec    Lipid Panel:   Cardiac Enzymes: CARDIAC MARKERS ( 11 Nov 2023 19:45 )  x     / x     / 183 U/L / x     / x            12 Lead ECG (11.11.23 @ 17:29) >  Sinus rhythm with 1st degree A-V block  Possible Anterior infarct , age undetermined  Abnormal ECG

## 2023-11-13 NOTE — ED ADULT NURSE REASSESSMENT NOTE - NS ED NURSE REASSESS COMMENT FT1
Pt awake, A/Ox 3 . No acute distress noted at present. Pt denies any pain at present. Denies any pain to left hip /buttocks with movement. Declines any pain meds at this time. Positive pedal pulses. Son at bedside. Pt awaiting eval by MD.
no distress noted. pt sleeping. Son at bedside.
Pt alert and responsive, denies pain but c/o of situation denying admitting diagnosis.

## 2023-11-13 NOTE — CONSULT NOTE ADULT - TIME BILLING
- Review of records, telemetry, vital signs and daily labs.   - General and cardiovascular physical examination.  - Generation of cardiovascular treatment plan.  - Coordination of care.      Patient was seen and examined by me on  11/13/2023,interim events noted,labs and radiology studies reviewed.  Otf Reyes MD,FACC.  [address] [place], [place] [id number].  [phone]

## 2023-11-13 NOTE — PROGRESS NOTE ADULT - PROBLEM SELECTOR PLAN 1
-NWB LLE, bedrest  - MRI left hip (11/12) Acute comminuted intertrochanteric left hip fracture.  - d/w Ortho team and nuclear medicine attending, bone scan is poor study for IT extension, MRI that was done is poor quality, orthopedic wants to repeat MRI left hip to r/o IT extension, cancelled bone scan  -regular diet  -f/u orthopedic recs  -Pain control as needed   -ice/cold compress -NWB LLE, bedrest  - MRI left hip (11/12) Acute comminuted intertrochanteric left hip fracture.  - d/w Ortho team and nuclear medicine attending, bone scan is poor study for IT extension, MRI that was done is poor quality, orthopedic wants to repeat MRI left hip to r/o IT extension, cancelled bone scan  - repeat MRI left hip, please expedite study  -regular diet  -f/u orthopedic recs  -Pain control as needed   -ice/cold compress -NWB LLE, bedrest  - MRI left hip (11/12) Acute comminuted intertrochanteric left hip fracture.  - d/w Ortho team and nuclear medicine attending, bone scan is poor study for IT extension, MRI that was done is poor quality, orthopedic wants to repeat MRI left hip to r/o IT extension, cancelled bone scan  - repeat MRI left hip, please expedite study  -regular diet  -f/u orthopedic recs  -Pain control as needed   -ice/cold compress  -updated son at bedside on 11/13

## 2023-11-13 NOTE — PHARMACOTHERAPY INTERVENTION NOTE - COMMENTS
Medication history is complete. Medication list updated in Outpatient Medication Record (OMR) per patient's son and [facility]. Per patient's son, patient self-discontinued all medications ~2 years ago, at annual physical in February 2023 there was no need to re-start any medications. Per [facility], no medications have been dispensed in over a year. Patient is only taking aspirin 81mg orally once a day.   Please call spectra [phone] if you have any questions.

## 2023-11-13 NOTE — PATIENT PROFILE ADULT - FALL HARM RISK - FALLEN IN PAST
Accidental fall Ilumya Counseling: I discussed with the patient the risks of tildrakizumab including but not limited to immunosuppression, malignancy, posterior leukoencephalopathy syndrome, and serious infections.  The patient understands that monitoring is required including a PPD at baseline and must alert us or the primary physician if symptoms of infection or other concerning signs are noted.

## 2023-11-13 NOTE — CONSULT NOTE ADULT - ASSESSMENT
89 yo f with left hip fracture following mechanical fall      #Hip fracture.   ·  Plan: -NWB LLE, bedrest  -Possible OR  - Overall this patient is at  intermediate risk (for cardiac death, nonfatal myocardial infarction, and nonfatal cardiac arrest perioperatively for this intermediate  risk procedure).     The patient is however without evidence of ACS, Decompensated Heart Failure,Obstructive Valvular Heart disease or Unstable arrhythmia.   There  are  no further recommendation for risk stratifying imaging/stress testing prior to planned surgery        #HTN (hypertension).   ·  Plan: normotensive currently; no pain meds given in ed thus far

## 2023-11-13 NOTE — PROGRESS NOTE ADULT - SUBJECTIVE AND OBJECTIVE BOX
Dr. Cathryn Chairez  Pager [phone]    PROGRESS NOTE:     Patient is a 89y old  Female who presents with a chief complaint of hip fx (13 Nov 2023 08:42)      SUBJECTIVE / OVERNIGHT EVENTS: pt denies chest pain or sob, hard of hearing, per son she fell last Friday, had MRI done showed left hip fx, not sure about IT extension, son upset about NPO status and lack of progress  ADDITIONAL REVIEW OF SYSTEMS: afebrile, has mild L hip when she sits up     MEDICATIONS  (STANDING):  amLODIPine   Tablet 5 milliGRAM(s) Oral daily  sodium chloride 0.9%. 1000 milliLiter(s) (50 mL/Hr) IV Continuous <Continuous>    MEDICATIONS  (PRN):  acetaminophen     Tablet .. 650 milliGRAM(s) Oral every 6 hours PRN Mild Pain (1 - 3), Moderate Pain (4 - 6)  ketorolac   Injectable 15 milliGRAM(s) IV Push every 6 hours PRN Severe Pain (7 - 10)      CAPILLARY BLOOD GLUCOSE        I&O's Summary      PHYSICAL EXAM:  Vital Signs Last 24 Hrs  T(C): 37 (13 Nov 2023 11:09), Max: 37 (13 Nov 2023 11:09)  T(F): 98.6 (13 Nov 2023 11:09), Max: 98.6 (13 Nov 2023 11:09)  HR: 82 (13 Nov 2023 11:09) (75 - 85)  BP: 178/83 (13 Nov 2023 11:09) (132/57 - 188/112)  BP(mean): --  RR: 16 (13 Nov 2023 11:09) (16 - 16)  SpO2: 98% (13 Nov 2023 11:09) (98% - 100%)    Parameters below as of 13 Nov 2023 11:09  Patient On (Oxygen Delivery Method): room air      CONSTITUTIONAL: NAD, well-developed  RESPIRATORY: Normal respiratory effort; lungs are clear to auscultation bilaterally  CARDIOVASCULAR: Regular rate and rhythm, normal S1 and S2, no murmur/rub/gallop; No lower extremity edema; Peripheral pulses are 2+ bilaterally  ABDOMEN: Nontender to palpation, normoactive bowel sounds, no rebound/guarding; No hepatosplenomegaly  MUSCULOSKELETAL:  left hip has good ROM, L hip TTP lateral aspect of greater trochanter   PSYCH: A+O to person, place, and time; affect appropriate    LABS:                        11.2   5.45  )-----------( 172      ( 13 Nov 2023 02:03 )             37.0     11-13    139  |  107  |  28<H>  ----------------------------<  103<H>  4.1   |  24  |  1.01    Ca    9.0      13 Nov 2023 02:03  Mg     2.2     11-11    TPro  8.3  /  Alb  3.1<L>  /  TBili  0.4  /  DBili  x   /  AST  26  /  ALT  21  /  AlkPhos  84  11-11    PT/INR - ( 13 Nov 2023 02:03 )   PT: 13.9 sec;   INR: 1.24 ratio         PTT - ( 13 Nov 2023 02:03 )  PTT:22.2 sec  CARDIAC MARKERS ( 11 Nov 2023 19:45 )  x     / x     / 183 U/L / x     / x          Urinalysis Basic - ( 13 Nov 2023 02:03 )    Color: x / Appearance: x / SG: x / pH: x  Gluc: 103 mg/dL / Ketone: x  / Bili: x / Urobili: x   Blood: x / Protein: x / Nitrite: x   Leuk Esterase: x / RBC: x / WBC x   Sq Epi: x / Non Sq Epi: x / Bacteria: x          RADIOLOGY & ADDITIONAL TESTS:  Results Reviewed:   Imaging Personally Reviewed:  < from: MR Hip No Cont, Left (11.12.23 @ 20:01) >  IMPRESSION:  Acute comminuted intertrochanteric left hip fracture.      < from: Xray Knee 3 Views, Left (11.12.23 @ 09:54) >  Nondisplaced fracture of the superior aspect of the left greater   trochanter.        Electrocardiogram Personally Reviewed:    COORDINATION OF CARE:  Care Discussed with Consultants/Other Providers [Y/N]: orthopedic addie muhammad, wants repeat MRI hip to r/o IT extension, MRI that was done is a poor study , d/w nuclear medicine dr. leos, bone scan not good study for IT extension  Prior or Outpatient Records Reviewed [Y/N]:

## 2023-11-14 LAB
ANION GAP SERPL CALC-SCNC: 7 MMOL/L — SIGNIFICANT CHANGE UP (ref 7–14)
ANION GAP SERPL CALC-SCNC: 7 MMOL/L — SIGNIFICANT CHANGE UP (ref 7–14)
APTT BLD: 25.9 SEC — SIGNIFICANT CHANGE UP (ref 24.5–35.6)
APTT BLD: 25.9 SEC — SIGNIFICANT CHANGE UP (ref 24.5–35.6)
BLD GP AB SCN SERPL QL: NEGATIVE — SIGNIFICANT CHANGE UP
BLD GP AB SCN SERPL QL: NEGATIVE — SIGNIFICANT CHANGE UP
BUN SERPL-MCNC: 32 MG/DL — HIGH (ref 7–23)
BUN SERPL-MCNC: 32 MG/DL — HIGH (ref 7–23)
CALCIUM SERPL-MCNC: 8.6 MG/DL — SIGNIFICANT CHANGE UP (ref 8.4–10.5)
CALCIUM SERPL-MCNC: 8.6 MG/DL — SIGNIFICANT CHANGE UP (ref 8.4–10.5)
CHLORIDE SERPL-SCNC: 107 MMOL/L — SIGNIFICANT CHANGE UP (ref 98–107)
CHLORIDE SERPL-SCNC: 107 MMOL/L — SIGNIFICANT CHANGE UP (ref 98–107)
CO2 SERPL-SCNC: 28 MMOL/L — SIGNIFICANT CHANGE UP (ref 22–31)
CO2 SERPL-SCNC: 28 MMOL/L — SIGNIFICANT CHANGE UP (ref 22–31)
CREAT SERPL-MCNC: 0.96 MG/DL — SIGNIFICANT CHANGE UP (ref 0.5–1.3)
CREAT SERPL-MCNC: 0.96 MG/DL — SIGNIFICANT CHANGE UP (ref 0.5–1.3)
EGFR: 54 ML/MIN/1.73M2 — LOW
EGFR: 54 ML/MIN/1.73M2 — LOW
GLUCOSE SERPL-MCNC: 94 MG/DL — SIGNIFICANT CHANGE UP (ref 70–99)
GLUCOSE SERPL-MCNC: 94 MG/DL — SIGNIFICANT CHANGE UP (ref 70–99)
HCT VFR BLD CALC: 33.6 % — LOW (ref 34.5–45)
HCT VFR BLD CALC: 33.6 % — LOW (ref 34.5–45)
HGB BLD-MCNC: 10.4 G/DL — LOW (ref 11.5–15.5)
HGB BLD-MCNC: 10.4 G/DL — LOW (ref 11.5–15.5)
INR BLD: 1.26 RATIO — HIGH (ref 0.85–1.18)
INR BLD: 1.26 RATIO — HIGH (ref 0.85–1.18)
MAGNESIUM SERPL-MCNC: 1.9 MG/DL — SIGNIFICANT CHANGE UP (ref 1.6–2.6)
MAGNESIUM SERPL-MCNC: 1.9 MG/DL — SIGNIFICANT CHANGE UP (ref 1.6–2.6)
MCHC RBC-ENTMCNC: 30.1 PG — SIGNIFICANT CHANGE UP (ref 27–34)
MCHC RBC-ENTMCNC: 30.1 PG — SIGNIFICANT CHANGE UP (ref 27–34)
MCHC RBC-ENTMCNC: 31 GM/DL — LOW (ref 32–36)
MCHC RBC-ENTMCNC: 31 GM/DL — LOW (ref 32–36)
MCV RBC AUTO: 97.1 FL — SIGNIFICANT CHANGE UP (ref 80–100)
MCV RBC AUTO: 97.1 FL — SIGNIFICANT CHANGE UP (ref 80–100)
MRSA PCR RESULT.: SIGNIFICANT CHANGE UP
MRSA PCR RESULT.: SIGNIFICANT CHANGE UP
NRBC # BLD: 0 /100 WBCS — SIGNIFICANT CHANGE UP (ref 0–0)
NRBC # BLD: 0 /100 WBCS — SIGNIFICANT CHANGE UP (ref 0–0)
NRBC # FLD: 0 K/UL — SIGNIFICANT CHANGE UP (ref 0–0)
NRBC # FLD: 0 K/UL — SIGNIFICANT CHANGE UP (ref 0–0)
PHOSPHATE SERPL-MCNC: 2.4 MG/DL — LOW (ref 2.5–4.5)
PHOSPHATE SERPL-MCNC: 2.4 MG/DL — LOW (ref 2.5–4.5)
PLATELET # BLD AUTO: 228 K/UL — SIGNIFICANT CHANGE UP (ref 150–400)
PLATELET # BLD AUTO: 228 K/UL — SIGNIFICANT CHANGE UP (ref 150–400)
POTASSIUM SERPL-MCNC: 4 MMOL/L — SIGNIFICANT CHANGE UP (ref 3.5–5.3)
POTASSIUM SERPL-MCNC: 4 MMOL/L — SIGNIFICANT CHANGE UP (ref 3.5–5.3)
POTASSIUM SERPL-SCNC: 4 MMOL/L — SIGNIFICANT CHANGE UP (ref 3.5–5.3)
POTASSIUM SERPL-SCNC: 4 MMOL/L — SIGNIFICANT CHANGE UP (ref 3.5–5.3)
PROTHROM AB SERPL-ACNC: 14.1 SEC — HIGH (ref 9.5–13)
PROTHROM AB SERPL-ACNC: 14.1 SEC — HIGH (ref 9.5–13)
RBC # BLD: 3.46 M/UL — LOW (ref 3.8–5.2)
RBC # BLD: 3.46 M/UL — LOW (ref 3.8–5.2)
RBC # FLD: 13.3 % — SIGNIFICANT CHANGE UP (ref 10.3–14.5)
RBC # FLD: 13.3 % — SIGNIFICANT CHANGE UP (ref 10.3–14.5)
RH IG SCN BLD-IMP: POSITIVE — SIGNIFICANT CHANGE UP
RH IG SCN BLD-IMP: POSITIVE — SIGNIFICANT CHANGE UP
S AUREUS DNA NOSE QL NAA+PROBE: SIGNIFICANT CHANGE UP
S AUREUS DNA NOSE QL NAA+PROBE: SIGNIFICANT CHANGE UP
SODIUM SERPL-SCNC: 142 MMOL/L — SIGNIFICANT CHANGE UP (ref 135–145)
SODIUM SERPL-SCNC: 142 MMOL/L — SIGNIFICANT CHANGE UP (ref 135–145)
T4 FREE SERPL-MCNC: 1.2 NG/DL — SIGNIFICANT CHANGE UP (ref 0.9–1.8)
T4 FREE SERPL-MCNC: 1.2 NG/DL — SIGNIFICANT CHANGE UP (ref 0.9–1.8)
WBC # BLD: 5.36 K/UL — SIGNIFICANT CHANGE UP (ref 3.8–10.5)
WBC # BLD: 5.36 K/UL — SIGNIFICANT CHANGE UP (ref 3.8–10.5)
WBC # FLD AUTO: 5.36 K/UL — SIGNIFICANT CHANGE UP (ref 3.8–10.5)
WBC # FLD AUTO: 5.36 K/UL — SIGNIFICANT CHANGE UP (ref 3.8–10.5)

## 2023-11-14 PROCEDURE — 99232 SBSQ HOSP IP/OBS MODERATE 35: CPT

## 2023-11-14 PROCEDURE — 73502 X-RAY EXAM HIP UNI 2-3 VIEWS: CPT | Mod: 26,RT

## 2023-11-14 PROCEDURE — 73721 MRI JNT OF LWR EXTRE W/O DYE: CPT | Mod: 26,LT

## 2023-11-14 PROCEDURE — 99232 SBSQ HOSP IP/OBS MODERATE 35: CPT | Mod: GC

## 2023-11-14 RX ORDER — HEPARIN SODIUM 5000 [USP'U]/ML
5000 INJECTION INTRAVENOUS; SUBCUTANEOUS EVERY 12 HOURS
Refills: 0 | Status: DISCONTINUED | OUTPATIENT
Start: 2023-11-14 | End: 2023-11-15

## 2023-11-14 RX ORDER — SODIUM,POTASSIUM PHOSPHATES 278-250MG
1 POWDER IN PACKET (EA) ORAL
Refills: 0 | Status: COMPLETED | OUTPATIENT
Start: 2023-11-14 | End: 2023-11-14

## 2023-11-14 RX ADMIN — Medication 1 PACKET(S): at 18:02

## 2023-11-14 RX ADMIN — CHLORHEXIDINE GLUCONATE 1 APPLICATION(S): 213 SOLUTION TOPICAL at 13:12

## 2023-11-14 RX ADMIN — HEPARIN SODIUM 5000 UNIT(S): 5000 INJECTION INTRAVENOUS; SUBCUTANEOUS at 18:03

## 2023-11-14 RX ADMIN — AMLODIPINE BESYLATE 5 MILLIGRAM(S): 2.5 TABLET ORAL at 06:57

## 2023-11-14 RX ADMIN — Medication 1 PACKET(S): at 13:11

## 2023-11-14 RX ADMIN — Medication 1 PACKET(S): at 10:57

## 2023-11-14 NOTE — PROGRESS NOTE ADULT - SUBJECTIVE AND OBJECTIVE BOX
ORTHO PROGRESS NOTE     Pt seen and examined at bedside, denies SOB, CP, Dizziness. N/V/D /HA.  No significant overnight events. Pain well controlled.    Vital Signs Last 24 Hrs  T(C): 36.9 (13 Nov 2023 20:56), Max: 37 (13 Nov 2023 11:09)  T(F): 98.4 (13 Nov 2023 20:56), Max: 98.6 (13 Nov 2023 11:09)  HR: 100 (13 Nov 2023 20:56) (72 - 100)  BP: 141/73 (13 Nov 2023 20:56) (119/50 - 184/90)  BP(mean): --  RR: 17 (13 Nov 2023 20:56) (16 - 18)  SpO2: 95% (13 Nov 2023 20:56) (95% - 100%)    Parameters below as of 13 Nov 2023 20:56  Patient On (Oxygen Delivery Method): room air        Gen: NAD, alert and oriented  Resp: Unlabored breathing  LLE: skin intact       - log roll        full painless ROM of hip       SILT DP/SP/ Sandip/Saph/tib       5/5 EHL 5/5 FHL 5/5 TA 5/5 Gastroc 5/5 IP        DP+,        soft compartments, no calf ttp,       Labs:  CBC Full  -  ( 14 Nov 2023 05:24 )  WBC Count : 5.36 K/uL  RBC Count : 3.46 M/uL  Hemoglobin : 10.4 g/dL  Hematocrit : 33.6 %  Platelet Count - Automated : 228 K/uL  Mean Cell Volume : 97.1 fL  Mean Cell Hemoglobin : 30.1 pg  Mean Cell Hemoglobin Concentration : 31.0 gm/dL  Auto Neutrophil # : x  Auto Lymphocyte # : x  Auto Monocyte # : x  Auto Eosinophil # : x  Auto Basophil # : x  Auto Neutrophil % : x  Auto Lymphocyte % : x  Auto Monocyte % : x  Auto Eosinophil % : x  Auto Basophil % : x      11-14    142  |  107  |  32<H>  ----------------------------<  94  4.0   |  28  |  0.96    Ca    8.6      14 Nov 2023 05:24  Phos  2.4     11-14  Mg     1.90     11-14        A/P  Pt is a 89y Female w/ L greater troch fx. Will f/u official read of MRI L hip to determine if there is intertrochanteric extension    - Pain control/ Analgesia  - NPO/IVF  - Hold DVT PPX  - F/u official read L hip MRI

## 2023-11-14 NOTE — PROGRESS NOTE ADULT - SUBJECTIVE AND OBJECTIVE BOX
PATIENT SEEN AND EXAMINED BY JANAY PATEL M.D. ON :- 11/14/23  DATE OF SERVICE:   11/14/23          Interim events noted,Labs ,Radiological studies and Cardiology tests reviewed .  DISCUSSED WITH ACP/MEDICAL RESIDENT ON PLAN OF CARE.    MR#[id number]  PATIENT NAME:[facility] COURSE: HPI:  89 yo f with listed h/o htn, thyroid disease . At time of my exam, pt sleeping, arousable, but does not want to participate in h&p. No one answering phone at  numbers listed in chart. Pt speaking lucidly, opening her eyes on command. Per ed/othro , pt sustained left intertrochanteric fracture. Seen by orthopedics here who upon reviewing CT and MR reading, are recommending bone scan in order to determine if pt requires surgery  (12 Nov 2023 23:45)      INTERIM EVENTS:Patient seen at bedside ,interim events noted.      PMH -reviewed admission note, no change since admission  HEART FAILURE: Acute[ ]Chronic[ ] Systolic[ ] Diastolic[ ] Combined Systolic and Diastolic[ ]  CAD[ ] CABG[ ] PCI[ ]  DEVICES[ ] PPM[ ] ICD[ ] ILR[ ]  ATRIAL FIBRILLATION[ ] Paroxysmal[ ] Permanent[ ] CHADS2-[  ]  VIVIANA[ ] CKD1[ ] CKD2[ ] CKD3[ ] CKD4[ ] ESRD[ ]  COPD[ ] HTN[ ]   DM[ ] Type1[ ] Type 2[ ]   CVA[ ] Paresis[ ]    AMBULATION: Assisted[ ] Cane/walker[ ] Independent[ ]    MEDICATIONS  (STANDING):  amLODIPine   Tablet 5 milliGRAM(s) Oral daily  chlorhexidine 2% Cloths 1 Application(s) Topical daily  heparin   Injectable 5000 Unit(s) SubCutaneous every 12 hours  influenza  Vaccine (HIGH DOSE) 0.7 milliLiter(s) IntraMuscular once    MEDICATIONS  (PRN):  acetaminophen     Tablet .. 650 milliGRAM(s) Oral every 6 hours PRN Mild Pain (1 - 3), Moderate Pain (4 - 6)  ketorolac   Injectable 15 milliGRAM(s) IV Push every 6 hours PRN Severe Pain (7 - 10)            REVIEW OF SYSTEMS:  Constitutional: [ ] fever, [ ]weight loss,  [ ]fatigue [ ]weight gain  Eyes: [ ] visual changes  Respiratory: [ ]shortness of breath;  [ ] cough, [ ]wheezing, [ ]chills, [ ]hemoptysis  Cardiovascular: [ ] chest pain, [ ]palpitations, [ ]dizziness,  [ ]leg swelling[ ]orthopnea[ ]PND  Gastrointestinal: [ ] abdominal pain, [ ]nausea, [ ]vomiting,  [ ]diarrhea [ ]Constipation [ ]Melena  Genitourinary: [ ] dysuria, [ ] hematuria [ ]Knight  Neurologic: [ ] headaches [ ] tremors[ ]weakness [ ]Paralysis Right[ ] Left[ ]  Skin: [ ] itching, [ ]burning, [ ] rashes  Endocrine: [ ] heat or cold intolerance  Musculoskeletal: [ ] joint pain or swelling; [ ] muscle, back, or extremity pain  Psychiatric: [ ] depression, [ ]anxiety, [ ]mood swings, or [ ]difficulty sleeping  Hematologic: [ ] easy bruising, [ ] bleeding gums    [ ] All remaining systems negative except as per above.   [ ]Unable to obtain.  [x] No change in ROS since admission      Vital Signs Last 24 Hrs  T(C): 36.8 (14 Nov 2023 13:47), Max: 36.9 (13 Nov 2023 20:56)  T(F): 98.3 (14 Nov 2023 13:47), Max: 98.4 (13 Nov 2023 20:56)  HR: 69 (14 Nov 2023 13:47) (69 - 100)  BP: 119/54 (14 Nov 2023 13:47) (119/50 - 153/67)  BP(mean): --  RR: 17 (14 Nov 2023 06:42) (17 - 18)  SpO2: 98% (14 Nov 2023 13:47) (95% - 98%)    Parameters below as of 14 Nov 2023 13:47  Patient On (Oxygen Delivery Method): room air      I&O's Summary      PHYSICAL EXAM:  General: No acute distress BMI-  HEENT: EOMI, PERRL  Neck: Supple, [ ] JVD  Lungs: Equal air entry bilaterally; [ ] rales [ ] wheezing [ ] rhonchi  Heart: Regular rate and rhythm; [x ] murmur   2/6 [ x] systolic [ ] diastolic [ ] radiation[ ] rubs [ ]  gallops  Abdomen: Nontender, bowel sounds present  Extremities: No clubbing, cyanosis, [ ] edema [ ]Pulses  equal and intact  Nervous system:  Alert & Oriented X3, no focal deficits  Psychiatric: Normal affect  Skin: No rashes or lesions    LABS:  11-14    142  |  107  |  32<H>  ----------------------------<  94  4.0   |  28  |  0.96    Ca    8.6      14 Nov 2023 05:24  Phos  2.4     11-14  Mg     1.90     11-14      Creatinine Trend: 0.96<--, 1.01<--, 0.96<--                        10.4   5.36  )-----------( 228      ( 14 Nov 2023 05:24 )             33.6     PT/INR - ( 14 Nov 2023 05:24 )   PT: 14.1 sec;   INR: 1.26 ratio         PTT - ( 14 Nov 2023 05:24 )  PTT:25.9 sec

## 2023-11-14 NOTE — PROGRESS NOTE ADULT - PROBLEM SELECTOR PLAN 2
normotensive initially, now elevated to 170-180's systolic to 80-90s diastolic  not on BP meds at home per son  started norvasc 5 mg qd  cardiology consulted, no further testing prior to planned surgery

## 2023-11-14 NOTE — CHART NOTE - NSCHARTNOTEFT_GEN_A_CORE
Called by Radiology for pt MR Hip/Pelvis + Acute fracture of the Left greater trochanter with intertrochanteric extension. Acute nondisplaced Right greater trochanter and suggestion of acute bilateral sacral fractures. Discussed results with Ortho resident. Pt is NPO for OR today, Will send AM labs and Check Xray Of R Hip/Pelvis.

## 2023-11-14 NOTE — PROGRESS NOTE ADULT - SUBJECTIVE AND OBJECTIVE BOX
Dr. Cathryn Chairez  Pager [phone]    PROGRESS NOTE:     Patient is a 89y old  Female who presents with a chief complaint of hip fx (14 Nov 2023 06:32)      SUBJECTIVE / OVERNIGHT EVENTS: denies chest pain or sob , OR cancelled today per orthopedic , wants to review imaging with radiology  ADDITIONAL REVIEW OF SYSTEMS: afebrile     MEDICATIONS  (STANDING):  amLODIPine   Tablet 5 milliGRAM(s) Oral daily  chlorhexidine 2% Cloths 1 Application(s) Topical daily  heparin   Injectable 5000 Unit(s) SubCutaneous every 12 hours  influenza  Vaccine (HIGH DOSE) 0.7 milliLiter(s) IntraMuscular once  potassium phosphate / sodium phosphate Powder (PHOS-NaK) 1 Packet(s) Oral three times a day with meals    MEDICATIONS  (PRN):  acetaminophen     Tablet .. 650 milliGRAM(s) Oral every 6 hours PRN Mild Pain (1 - 3), Moderate Pain (4 - 6)  ketorolac   Injectable 15 milliGRAM(s) IV Push every 6 hours PRN Severe Pain (7 - 10)      CAPILLARY BLOOD GLUCOSE        I&O's Summary      PHYSICAL EXAM:  Vital Signs Last 24 Hrs  T(C): 36.4 (14 Nov 2023 06:42), Max: 36.9 (13 Nov 2023 14:40)  T(F): 97.6 (14 Nov 2023 06:42), Max: 98.5 (13 Nov 2023 14:40)  HR: 97 (14 Nov 2023 06:42) (72 - 100)  BP: 153/67 (14 Nov 2023 06:42) (119/50 - 153/67)  BP(mean): --  RR: 17 (14 Nov 2023 06:42) (17 - 18)  SpO2: 97% (14 Nov 2023 06:42) (95% - 99%)    Parameters below as of 14 Nov 2023 06:42  Patient On (Oxygen Delivery Method): room air    CONSTITUTIONAL: NAD, well-developed  RESPIRATORY: Normal respiratory effort; lungs are clear to auscultation bilaterally  CARDIOVASCULAR: Regular rate and rhythm, normal S1 and S2, no murmur/rub/gallop; No lower extremity edema; Peripheral pulses are 2+ bilaterally  ABDOMEN: Nontender to palpation, normoactive bowel sounds, no rebound/guarding; No hepatosplenomegaly  MUSCULOSKELETAL:  b/l hips have good ROM, L hip TTP lateral aspect of greater trochanter   PSYCH: A+O to person, place, and time; affect appropriate      LABS:                        10.4   5.36  )-----------( 228      ( 14 Nov 2023 05:24 )             33.6     11-14    142  |  107  |  32<H>  ----------------------------<  94  4.0   |  28  |  0.96    Ca    8.6      14 Nov 2023 05:24  Phos  2.4     11-14  Mg     1.90     11-14      PT/INR - ( 14 Nov 2023 05:24 )   PT: 14.1 sec;   INR: 1.26 ratio         PTT - ( 14 Nov 2023 05:24 )  PTT:25.9 sec      Urinalysis Basic - ( 14 Nov 2023 05:24 )    Color: x / Appearance: x / SG: x / pH: x  Gluc: 94 mg/dL / Ketone: x  / Bili: x / Urobili: x   Blood: x / Protein: x / Nitrite: x   Leuk Esterase: x / RBC: x / WBC x   Sq Epi: x / Non Sq Epi: x / Bacteria: x      RADIOLOGY & ADDITIONAL TESTS:  Results Reviewed:   Imaging Personally Reviewed:  < from: MR Hip No Cont, Left (11.14.23 @ 00:32) >  IMPRESSION: Bilateral greater trochanteric fractures. On the right, the   fracture does not extend to the midline of the proximal femur. On the   left, fracture extends just beyond the midline of the proximal femur   towards the lesser trochanter.    Patchy marrow edema and low signal within the inferior aspect of the   posterior sacrum compatible with a nondisplaced fracture.    High-grade tearing of the right gluteus minimustendon with adjacent   reactive greater trochanteric bursitis. Right-sided adductor muscle   strain.      Electrocardiogram Personally Reviewed:    COORDINATION OF CARE:  Care Discussed with Consultants/Other Providers [Y/N]:  Prior or Outpatient Records Reviewed [Y/N]:   Dr. Cathryn Chairez  Pager [phone]    PROGRESS NOTE:     Patient is a 89y old  Female who presents with a chief complaint of hip fx (14 Nov 2023 06:32)      SUBJECTIVE / OVERNIGHT EVENTS: denies chest pain or sob , OR cancelled today per orthopedic , wants to review imaging with radiology  ADDITIONAL REVIEW OF SYSTEMS: afebrile     MEDICATIONS  (STANDING):  amLODIPine   Tablet 5 milliGRAM(s) Oral daily  chlorhexidine 2% Cloths 1 Application(s) Topical daily  heparin   Injectable 5000 Unit(s) SubCutaneous every 12 hours  influenza  Vaccine (HIGH DOSE) 0.7 milliLiter(s) IntraMuscular once  potassium phosphate / sodium phosphate Powder (PHOS-NaK) 1 Packet(s) Oral three times a day with meals    MEDICATIONS  (PRN):  acetaminophen     Tablet .. 650 milliGRAM(s) Oral every 6 hours PRN Mild Pain (1 - 3), Moderate Pain (4 - 6)  ketorolac   Injectable 15 milliGRAM(s) IV Push every 6 hours PRN Severe Pain (7 - 10)      CAPILLARY BLOOD GLUCOSE        I&O's Summary      PHYSICAL EXAM:  Vital Signs Last 24 Hrs  T(C): 36.4 (14 Nov 2023 06:42), Max: 36.9 (13 Nov 2023 14:40)  T(F): 97.6 (14 Nov 2023 06:42), Max: 98.5 (13 Nov 2023 14:40)  HR: 97 (14 Nov 2023 06:42) (72 - 100)  BP: 153/67 (14 Nov 2023 06:42) (119/50 - 153/67)  BP(mean): --  RR: 17 (14 Nov 2023 06:42) (17 - 18)  SpO2: 97% (14 Nov 2023 06:42) (95% - 99%)    Parameters below as of 14 Nov 2023 06:42  Patient On (Oxygen Delivery Method): room air    CONSTITUTIONAL: NAD, well-developed  RESPIRATORY: Normal respiratory effort; lungs are clear to auscultation bilaterally  CARDIOVASCULAR: Regular rate and rhythm, normal S1 and S2, no murmur/rub/gallop; No lower extremity edema; Peripheral pulses are 2+ bilaterally  ABDOMEN: Nontender to palpation, normoactive bowel sounds, no rebound/guarding; No hepatosplenomegaly  MUSCULOSKELETAL:  b/l hips have good ROM, L hip TTP lateral aspect of greater trochanter   PSYCH: A+O to person, place, and time; affect appropriate      LABS:                        10.4   5.36  )-----------( 228      ( 14 Nov 2023 05:24 )             33.6     11-14    142  |  107  |  32<H>  ----------------------------<  94  4.0   |  28  |  0.96    Ca    8.6      14 Nov 2023 05:24  Phos  2.4     11-14  Mg     1.90     11-14      PT/INR - ( 14 Nov 2023 05:24 )   PT: 14.1 sec;   INR: 1.26 ratio         PTT - ( 14 Nov 2023 05:24 )  PTT:25.9 sec      Urinalysis Basic - ( 14 Nov 2023 05:24 )    Color: x / Appearance: x / SG: x / pH: x  Gluc: 94 mg/dL / Ketone: x  / Bili: x / Urobili: x   Blood: x / Protein: x / Nitrite: x   Leuk Esterase: x / RBC: x / WBC x   Sq Epi: x / Non Sq Epi: x / Bacteria: x      RADIOLOGY & ADDITIONAL TESTS:  Results Reviewed:   Imaging Personally Reviewed:  < from: MR Hip No Cont, Left (11.14.23 @ 00:32) >  IMPRESSION: Bilateral greater trochanteric fractures. On the right, the   fracture does not extend to the midline of the proximal femur. On the   left, fracture extends just beyond the midline of the proximal femur   towards the lesser trochanter.    Patchy marrow edema and low signal within the inferior aspect of the   posterior sacrum compatible with a nondisplaced fracture.    High-grade tearing of the right gluteus minimustendon with adjacent   reactive greater trochanteric bursitis. Right-sided adductor muscle   strain.      Electrocardiogram Personally Reviewed:    COORDINATION OF CARE:  Care Discussed with Consultants/Other Providers [Y/N]: orthopedic team, will review imaging with radiology, no OR today, can have diet  Prior or Outpatient Records Reviewed [Y/N]:

## 2023-11-14 NOTE — PROGRESS NOTE ADULT - PROBLEM SELECTOR PLAN 1
-NWB LLE, bedrest  - MRI left hip (11/12) Acute comminuted intertrochanteric left hip fracture.  - Repeat MRI (11/13)  Bilateral greater trochanteric fractures. On the right, the   fracture does not extend to the midline of the proximal femur. On the   left, fracture extends just beyond the midline of the proximal femur   towards the lesser trochanter.  Patchy marrow edema and low signal within the inferior aspect of the   posterior sacrum compatible with a nondisplaced fracture.  High-grade tearing of the right gluteus minimus tendon with adjacent   reactive greater trochanteric bursitis. Right-sided adductor muscle   strain.  - Xray right hip/pelvis  - d/w Ortho team, no OR today, they will review imaging with radiology and decide   - regular diet  -Pain control as needed

## 2023-11-15 ENCOUNTER — TRANSCRIPTION ENCOUNTER (OUTPATIENT)
Age: 88
End: 2023-11-15

## 2023-11-15 PROBLEM — I10 ESSENTIAL (PRIMARY) HYPERTENSION: Chronic | Status: ACTIVE | Noted: 2023-11-12

## 2023-11-15 PROBLEM — Z86.39 PERSONAL HISTORY OF OTHER ENDOCRINE, NUTRITIONAL AND METABOLIC DISEASE: Chronic | Status: ACTIVE | Noted: 2023-11-12

## 2023-11-15 LAB
ANION GAP SERPL CALC-SCNC: 9 MMOL/L — SIGNIFICANT CHANGE UP (ref 7–14)
ANION GAP SERPL CALC-SCNC: 9 MMOL/L — SIGNIFICANT CHANGE UP (ref 7–14)
BUN SERPL-MCNC: 26 MG/DL — HIGH (ref 7–23)
BUN SERPL-MCNC: 26 MG/DL — HIGH (ref 7–23)
CALCIUM SERPL-MCNC: 8.7 MG/DL — SIGNIFICANT CHANGE UP (ref 8.4–10.5)
CALCIUM SERPL-MCNC: 8.7 MG/DL — SIGNIFICANT CHANGE UP (ref 8.4–10.5)
CHLORIDE SERPL-SCNC: 105 MMOL/L — SIGNIFICANT CHANGE UP (ref 98–107)
CHLORIDE SERPL-SCNC: 105 MMOL/L — SIGNIFICANT CHANGE UP (ref 98–107)
CO2 SERPL-SCNC: 29 MMOL/L — SIGNIFICANT CHANGE UP (ref 22–31)
CO2 SERPL-SCNC: 29 MMOL/L — SIGNIFICANT CHANGE UP (ref 22–31)
CREAT SERPL-MCNC: 0.88 MG/DL — SIGNIFICANT CHANGE UP (ref 0.5–1.3)
CREAT SERPL-MCNC: 0.88 MG/DL — SIGNIFICANT CHANGE UP (ref 0.5–1.3)
EGFR: 60 ML/MIN/1.73M2 — SIGNIFICANT CHANGE UP
EGFR: 60 ML/MIN/1.73M2 — SIGNIFICANT CHANGE UP
GLUCOSE SERPL-MCNC: 99 MG/DL — SIGNIFICANT CHANGE UP (ref 70–99)
GLUCOSE SERPL-MCNC: 99 MG/DL — SIGNIFICANT CHANGE UP (ref 70–99)
HCT VFR BLD CALC: 34.5 % — SIGNIFICANT CHANGE UP (ref 34.5–45)
HCT VFR BLD CALC: 34.5 % — SIGNIFICANT CHANGE UP (ref 34.5–45)
HGB BLD-MCNC: 10.7 G/DL — LOW (ref 11.5–15.5)
HGB BLD-MCNC: 10.7 G/DL — LOW (ref 11.5–15.5)
MAGNESIUM SERPL-MCNC: 1.9 MG/DL — SIGNIFICANT CHANGE UP (ref 1.6–2.6)
MAGNESIUM SERPL-MCNC: 1.9 MG/DL — SIGNIFICANT CHANGE UP (ref 1.6–2.6)
MCHC RBC-ENTMCNC: 29.8 PG — SIGNIFICANT CHANGE UP (ref 27–34)
MCHC RBC-ENTMCNC: 29.8 PG — SIGNIFICANT CHANGE UP (ref 27–34)
MCHC RBC-ENTMCNC: 31 GM/DL — LOW (ref 32–36)
MCHC RBC-ENTMCNC: 31 GM/DL — LOW (ref 32–36)
MCV RBC AUTO: 96.1 FL — SIGNIFICANT CHANGE UP (ref 80–100)
MCV RBC AUTO: 96.1 FL — SIGNIFICANT CHANGE UP (ref 80–100)
NRBC # BLD: 0 /100 WBCS — SIGNIFICANT CHANGE UP (ref 0–0)
NRBC # BLD: 0 /100 WBCS — SIGNIFICANT CHANGE UP (ref 0–0)
NRBC # FLD: 0 K/UL — SIGNIFICANT CHANGE UP (ref 0–0)
NRBC # FLD: 0 K/UL — SIGNIFICANT CHANGE UP (ref 0–0)
PHOSPHATE SERPL-MCNC: 3.3 MG/DL — SIGNIFICANT CHANGE UP (ref 2.5–4.5)
PHOSPHATE SERPL-MCNC: 3.3 MG/DL — SIGNIFICANT CHANGE UP (ref 2.5–4.5)
PLATELET # BLD AUTO: 232 K/UL — SIGNIFICANT CHANGE UP (ref 150–400)
PLATELET # BLD AUTO: 232 K/UL — SIGNIFICANT CHANGE UP (ref 150–400)
POTASSIUM SERPL-MCNC: 4.5 MMOL/L — SIGNIFICANT CHANGE UP (ref 3.5–5.3)
POTASSIUM SERPL-MCNC: 4.5 MMOL/L — SIGNIFICANT CHANGE UP (ref 3.5–5.3)
POTASSIUM SERPL-SCNC: 4.5 MMOL/L — SIGNIFICANT CHANGE UP (ref 3.5–5.3)
POTASSIUM SERPL-SCNC: 4.5 MMOL/L — SIGNIFICANT CHANGE UP (ref 3.5–5.3)
RBC # BLD: 3.59 M/UL — LOW (ref 3.8–5.2)
RBC # BLD: 3.59 M/UL — LOW (ref 3.8–5.2)
RBC # FLD: 13.1 % — SIGNIFICANT CHANGE UP (ref 10.3–14.5)
RBC # FLD: 13.1 % — SIGNIFICANT CHANGE UP (ref 10.3–14.5)
SODIUM SERPL-SCNC: 143 MMOL/L — SIGNIFICANT CHANGE UP (ref 135–145)
SODIUM SERPL-SCNC: 143 MMOL/L — SIGNIFICANT CHANGE UP (ref 135–145)
WBC # BLD: 5.15 K/UL — SIGNIFICANT CHANGE UP (ref 3.8–10.5)
WBC # BLD: 5.15 K/UL — SIGNIFICANT CHANGE UP (ref 3.8–10.5)
WBC # FLD AUTO: 5.15 K/UL — SIGNIFICANT CHANGE UP (ref 3.8–10.5)
WBC # FLD AUTO: 5.15 K/UL — SIGNIFICANT CHANGE UP (ref 3.8–10.5)

## 2023-11-15 PROCEDURE — 99232 SBSQ HOSP IP/OBS MODERATE 35: CPT

## 2023-11-15 RX ORDER — SODIUM CHLORIDE 9 MG/ML
1000 INJECTION, SOLUTION INTRAVENOUS
Refills: 0 | Status: DISCONTINUED | OUTPATIENT
Start: 2023-11-15 | End: 2023-11-16

## 2023-11-15 RX ORDER — HEPARIN SODIUM 5000 [USP'U]/ML
5000 INJECTION INTRAVENOUS; SUBCUTANEOUS ONCE
Refills: 0 | Status: COMPLETED | OUTPATIENT
Start: 2023-11-15 | End: 2023-11-15

## 2023-11-15 RX ADMIN — HEPARIN SODIUM 5000 UNIT(S): 5000 INJECTION INTRAVENOUS; SUBCUTANEOUS at 18:03

## 2023-11-15 RX ADMIN — CHLORHEXIDINE GLUCONATE 1 APPLICATION(S): 213 SOLUTION TOPICAL at 12:33

## 2023-11-15 RX ADMIN — AMLODIPINE BESYLATE 5 MILLIGRAM(S): 2.5 TABLET ORAL at 06:32

## 2023-11-15 RX ADMIN — HEPARIN SODIUM 5000 UNIT(S): 5000 INJECTION INTRAVENOUS; SUBCUTANEOUS at 06:32

## 2023-11-15 RX ADMIN — SODIUM CHLORIDE 60 MILLILITER(S): 9 INJECTION, SOLUTION INTRAVENOUS at 12:18

## 2023-11-15 NOTE — PROGRESS NOTE ADULT - SUBJECTIVE AND OBJECTIVE BOX
Dr. Cathryn Chariez  Pager [phone]    PROGRESS NOTE:     Patient is a 89y old  Female who presents with a chief complaint of hip fx (15 Nov 2023 06:29)      SUBJECTIVE / OVERNIGHT EVENTS: denies chest pain or sob   ADDITIONAL REVIEW OF SYSTEMS: afebrile , mild left hip pain with movement    MEDICATIONS  (STANDING):  amLODIPine   Tablet 5 milliGRAM(s) Oral daily  chlorhexidine 2% Cloths 1 Application(s) Topical daily  dextrose 5% + sodium chloride 0.45%. 1000 milliLiter(s) (60 mL/Hr) IV Continuous <Continuous>  heparin   Injectable 5000 Unit(s) SubCutaneous once  influenza  Vaccine (HIGH DOSE) 0.7 milliLiter(s) IntraMuscular once    MEDICATIONS  (PRN):  acetaminophen     Tablet .. 650 milliGRAM(s) Oral every 6 hours PRN Mild Pain (1 - 3), Moderate Pain (4 - 6)      CAPILLARY BLOOD GLUCOSE        I&O's Summary      PHYSICAL EXAM:  Vital Signs Last 24 Hrs  T(C): 36.4 (15 Nov 2023 13:23), Max: 36.8 (14 Nov 2023 13:47)  T(F): 97.5 (15 Nov 2023 13:23), Max: 98.3 (14 Nov 2023 13:47)  HR: 68 (15 Nov 2023 13:23) (67 - 79)  BP: 127/68 (15 Nov 2023 13:23) (119/54 - 155/66)  BP(mean): --  RR: 18 (15 Nov 2023 13:23) (17 - 18)  SpO2: 100% (15 Nov 2023 13:23) (96% - 100%)    Parameters below as of 15 Nov 2023 13:23  Patient On (Oxygen Delivery Method): room air    CONSTITUTIONAL: NAD, well-developed  RESPIRATORY: Normal respiratory effort; lungs are clear to auscultation bilaterally  CARDIOVASCULAR: Regular rate and rhythm, normal S1 and S2, no murmur/rub/gallop; No lower extremity edema; Peripheral pulses are 2+ bilaterally  ABDOMEN: Nontender to palpation, normoactive bowel sounds, no rebound/guarding; No hepatosplenomegaly  MUSCULOSKELETAL:  b/l hips have good ROM, L hip TTP lateral aspect of greater trochanter   PSYCH: A+O to person, place, and time; affect appropriate      LABS:                        10.7   5.15  )-----------( 232      ( 15 Nov 2023 07:25 )             34.5     11-15    143  |  105  |  26<H>  ----------------------------<  99  4.5   |  29  |  0.88    Ca    8.7      15 Nov 2023 07:25  Phos  3.3     11-15  Mg     1.90     11-15      PT/INR - ( 14 Nov 2023 05:24 )   PT: 14.1 sec;   INR: 1.26 ratio         PTT - ( 14 Nov 2023 05:24 )  PTT:25.9 sec      Urinalysis Basic - ( 15 Nov 2023 07:25 )    Color: x / Appearance: x / SG: x / pH: x  Gluc: 99 mg/dL / Ketone: x  / Bili: x / Urobili: x   Blood: x / Protein: x / Nitrite: x   Leuk Esterase: x / RBC: x / WBC x   Sq Epi: x / Non Sq Epi: x / Bacteria: x          RADIOLOGY & ADDITIONAL TESTS:  Results Reviewed:   Imaging Personally Reviewed:  < from: Xray Hip w/ Pelvis 2 or 3 Views, Right (11.14.23 @ 09:22) >    IMPRESSION:  No acute displaced fracture. If pain persists, dedicated CT is   recommended.  Osseous density at the superior aspect of the left greater trochanter   suspicious for a acute/subacute fracture, unchanged.      Electrocardiogram Personally Reviewed:    COORDINATION OF CARE:  Care Discussed with Consultants/Other Providers [Y/N]:  Prior or Outpatient Records Reviewed [Y/N]:

## 2023-11-15 NOTE — PROGRESS NOTE ADULT - PROBLEM SELECTOR PLAN 2
normotensive initially, now elevated to 170-180's systolic to 80-90s diastolic  not on BP meds at home per son  started norvasc 5 mg qd  cardiology consulted, no further testing prior to planned surgery not on home meds  bp controlled on norvasc 5 mg qd started here  Cardiology consulted, no further testing prior to planned surgery

## 2023-11-15 NOTE — PROGRESS NOTE ADULT - PROBLEM SELECTOR PLAN 1
-NWB LLE, bedrest  - MRI left hip (11/12) Acute comminuted intertrochanteric left hip fracture.  - Repeat MRI (11/13)  Bilateral greater trochanteric fractures. On the right, the   fracture does not extend to the midline of the proximal femur. On the   left, fracture extends just beyond the midline of the proximal femur   towards the lesser trochanter.  Patchy marrow edema and low signal within the inferior aspect of the   posterior sacrum compatible with a nondisplaced fracture.  High-grade tearing of the right gluteus minimus tendon with adjacent   reactive greater trochanteric bursitis. Right-sided adductor muscle   strain.  -xray reviewed by orthopedic,   Osseous density at the superior aspect of the left greater trochanter   suspicious for a acute/subacute fracture, unchanged.  - plan for OR tomorrow, NPO past MN  pt is medically optimized for OR, no need for further study, RCRI 1m 6% periop MACE  - hold prophylactic heparin   -Pain control as needed  - dc plan to rehab after surgery, f/u SW. Updated pt's son at bedside on 11/15 -NWB LLE, bedrest  - MRI left hip (11/12) Acute comminuted intertrochanteric left hip fracture.  - Repeat MRI (11/13)  Bilateral greater trochanteric fractures. On the right, the   fracture does not extend to the midline of the proximal femur. On the   left, fracture extends just beyond the midline of the proximal femur   towards the lesser trochanter.  Patchy marrow edema and low signal within the inferior aspect of the   posterior sacrum compatible with a nondisplaced fracture.  High-grade tearing of the right gluteus minimus tendon with adjacent   reactive greater trochanteric bursitis. Right-sided adductor muscle   strain.  -xray reviewed by orthopedic,   Osseous density at the superior aspect of the left greater trochanter   suspicious for a acute/subacute fracture, unchanged.  - plan for OR tomorrow, NPO past MN  pt is medically optimized for OR, no need for further study, RCRI 1, 6% periop MACE  - hold prophylactic heparin   -Pain control as needed  - dc plan to rehab after surgery, f/u SW. Updated pt's son at bedside on 11/15

## 2023-11-15 NOTE — PROGRESS NOTE ADULT - SUBJECTIVE AND OBJECTIVE BOX
PATIENT SEEN AND EXAMINED BY JANAY PATEL M.D. ON :- 11/15/23  DATE OF SERVICE:     11/15/23        Interim events noted,Labs ,Radiological studies and Cardiology tests reviewed .  DISCUSSED WITH ACP/MEDICAL RESIDENT ON PLAN OF CARE.    MR#[id number]  PATIENT NAME:[facility] COURSE: HPI:  89 yo f with listed h/o htn, thyroid disease . At time of my exam, pt sleeping, arousable, but does not want to participate in h&p. No one answering phone at  numbers listed in chart. Pt speaking lucidly, opening her eyes on command. Per ed/othro , pt sustained left intertrochanteric fracture. Seen by orthopedics here who upon reviewing CT and MR reading, are recommending bone scan in order to determine if pt requires surgery  (12 Nov 2023 23:45)      INTERIM EVENTS:Patient seen at bedside ,interim events noted.      PMH -reviewed admission note, no change since admission  HEART FAILURE: Acute[ ]Chronic[ ] Systolic[ ] Diastolic[ ] Combined Systolic and Diastolic[ ]  CAD[ ] CABG[ ] PCI[ ]  DEVICES[ ] PPM[ ] ICD[ ] ILR[ ]  ATRIAL FIBRILLATION[ ] Paroxysmal[ ] Permanent[ ] CHADS2-[  ]  VIVIANA[ ] CKD1[ ] CKD2[ ] CKD3[ ] CKD4[ ] ESRD[ ]  COPD[ ] HTN[ ]   DM[ ] Type1[ ] Type 2[ ]   CVA[ ] Paresis[ ]    AMBULATION: Assisted[ ] Cane/walker[ ] Independent[ ]    MEDICATIONS  (STANDING):  amLODIPine   Tablet 5 milliGRAM(s) Oral daily  chlorhexidine 2% Cloths 1 Application(s) Topical daily  dextrose 5% + sodium chloride 0.45%. 1000 milliLiter(s) (60 mL/Hr) IV Continuous <Continuous>  influenza  Vaccine (HIGH DOSE) 0.7 milliLiter(s) IntraMuscular once    MEDICATIONS  (PRN):  acetaminophen     Tablet .. 650 milliGRAM(s) Oral every 6 hours PRN Mild Pain (1 - 3), Moderate Pain (4 - 6)            REVIEW OF SYSTEMS:  Constitutional: [ ] fever, [ ]weight loss,  [ ]fatigue [ ]weight gain  Eyes: [ ] visual changes  Respiratory: [ ]shortness of breath;  [ ] cough, [ ]wheezing, [ ]chills, [ ]hemoptysis  Cardiovascular: [ ] chest pain, [ ]palpitations, [ ]dizziness,  [ ]leg swelling[ ]orthopnea[ ]PND  Gastrointestinal: [ ] abdominal pain, [ ]nausea, [ ]vomiting,  [ ]diarrhea [ ]Constipation [ ]Melena  Genitourinary: [ ] dysuria, [ ] hematuria [ ]Knight  Neurologic: [ ] headaches [ ] tremors[ ]weakness [ ]Paralysis Right[ ] Left[ ]  Skin: [ ] itching, [ ]burning, [ ] rashes  Endocrine: [ ] heat or cold intolerance  Musculoskeletal: [ ] joint pain or swelling; [ ] muscle, back, or extremity pain  Psychiatric: [ ] depression, [ ]anxiety, [ ]mood swings, or [ ]difficulty sleeping  Hematologic: [ ] easy bruising, [ ] bleeding gums    [ ] All remaining systems negative except as per above.   [ ]Unable to obtain.  [x] No change in ROS since admission      Vital Signs Last 24 Hrs  T(C): 36.4 (15 Nov 2023 13:23), Max: 36.6 (14 Nov 2023 21:07)  T(F): 97.5 (15 Nov 2023 13:23), Max: 97.9 (14 Nov 2023 21:07)  HR: 68 (15 Nov 2023 13:23) (67 - 79)  BP: 127/68 (15 Nov 2023 13:23) (127/68 - 155/66)  BP(mean): --  RR: 18 (15 Nov 2023 13:23) (17 - 18)  SpO2: 100% (15 Nov 2023 13:23) (96% - 100%)    Parameters below as of 15 Nov 2023 13:23  Patient On (Oxygen Delivery Method): room air      I&O's Summary      PHYSICAL EXAM:  General: No acute distress BMI-  HEENT: EOMI, PERRL  Neck: Supple, [ ] JVD  Lungs: Equal air entry bilaterally; [ ] rales [ ] wheezing [ ] rhonchi  Heart: Regular rate and rhythm; [x ] murmur   2/6 [ x] systolic [ ] diastolic [ ] radiation[ ] rubs [ ]  gallops  Abdomen: Nontender, bowel sounds present  Extremities: No clubbing, cyanosis, [ ] edema [ ]Pulses  equal and intact  Nervous system:  Alert & Oriented X3, no focal deficits  Psychiatric: Normal affect  Skin: No rashes or lesions    LABS:  11-15    143  |  105  |  26<H>  ----------------------------<  99  4.5   |  29  |  0.88    Ca    8.7      15 Nov 2023 07:25  Phos  3.3     11-15  Mg     1.90     11-15      Creatinine Trend: 0.88<--, 0.96<--, 1.01<--, 0.96<--                        10.7   5.15  )-----------( 232      ( 15 Nov 2023 07:25 )             34.5     PT/INR - ( 14 Nov 2023 05:24 )   PT: 14.1 sec;   INR: 1.26 ratio         PTT - ( 14 Nov 2023 05:24 )  PTT:25.9 sec

## 2023-11-16 ENCOUNTER — APPOINTMENT (OUTPATIENT)
Dept: ORTHOPEDIC SURGERY | Facility: HOSPITAL | Age: 88
End: 2023-11-16

## 2023-11-16 LAB
ANION GAP SERPL CALC-SCNC: 7 MMOL/L — SIGNIFICANT CHANGE UP (ref 7–14)
ANION GAP SERPL CALC-SCNC: 7 MMOL/L — SIGNIFICANT CHANGE UP (ref 7–14)
ANION GAP SERPL CALC-SCNC: 9 MMOL/L — SIGNIFICANT CHANGE UP (ref 7–14)
ANION GAP SERPL CALC-SCNC: 9 MMOL/L — SIGNIFICANT CHANGE UP (ref 7–14)
APTT BLD: 25.1 SEC — SIGNIFICANT CHANGE UP (ref 24.5–35.6)
APTT BLD: 25.1 SEC — SIGNIFICANT CHANGE UP (ref 24.5–35.6)
BLD GP AB SCN SERPL QL: NEGATIVE — SIGNIFICANT CHANGE UP
BLD GP AB SCN SERPL QL: NEGATIVE — SIGNIFICANT CHANGE UP
BUN SERPL-MCNC: 18 MG/DL — SIGNIFICANT CHANGE UP (ref 7–23)
BUN SERPL-MCNC: 18 MG/DL — SIGNIFICANT CHANGE UP (ref 7–23)
BUN SERPL-MCNC: 22 MG/DL — SIGNIFICANT CHANGE UP (ref 7–23)
BUN SERPL-MCNC: 22 MG/DL — SIGNIFICANT CHANGE UP (ref 7–23)
CALCIUM SERPL-MCNC: 8.9 MG/DL — SIGNIFICANT CHANGE UP (ref 8.4–10.5)
CALCIUM SERPL-MCNC: 8.9 MG/DL — SIGNIFICANT CHANGE UP (ref 8.4–10.5)
CALCIUM SERPL-MCNC: 9.1 MG/DL — SIGNIFICANT CHANGE UP (ref 8.4–10.5)
CALCIUM SERPL-MCNC: 9.1 MG/DL — SIGNIFICANT CHANGE UP (ref 8.4–10.5)
CHLORIDE SERPL-SCNC: 103 MMOL/L — SIGNIFICANT CHANGE UP (ref 98–107)
CHLORIDE SERPL-SCNC: 103 MMOL/L — SIGNIFICANT CHANGE UP (ref 98–107)
CHLORIDE SERPL-SCNC: 104 MMOL/L — SIGNIFICANT CHANGE UP (ref 98–107)
CHLORIDE SERPL-SCNC: 104 MMOL/L — SIGNIFICANT CHANGE UP (ref 98–107)
CO2 SERPL-SCNC: 27 MMOL/L — SIGNIFICANT CHANGE UP (ref 22–31)
CO2 SERPL-SCNC: 27 MMOL/L — SIGNIFICANT CHANGE UP (ref 22–31)
CO2 SERPL-SCNC: 28 MMOL/L — SIGNIFICANT CHANGE UP (ref 22–31)
CO2 SERPL-SCNC: 28 MMOL/L — SIGNIFICANT CHANGE UP (ref 22–31)
CREAT SERPL-MCNC: 0.76 MG/DL — SIGNIFICANT CHANGE UP (ref 0.5–1.3)
CREAT SERPL-MCNC: 0.76 MG/DL — SIGNIFICANT CHANGE UP (ref 0.5–1.3)
CREAT SERPL-MCNC: 0.83 MG/DL — SIGNIFICANT CHANGE UP (ref 0.5–1.3)
CREAT SERPL-MCNC: 0.83 MG/DL — SIGNIFICANT CHANGE UP (ref 0.5–1.3)
EGFR: 64 ML/MIN/1.73M2 — SIGNIFICANT CHANGE UP
EGFR: 64 ML/MIN/1.73M2 — SIGNIFICANT CHANGE UP
EGFR: 72 ML/MIN/1.73M2 — SIGNIFICANT CHANGE UP
EGFR: 72 ML/MIN/1.73M2 — SIGNIFICANT CHANGE UP
GLUCOSE SERPL-MCNC: 106 MG/DL — HIGH (ref 70–99)
GLUCOSE SERPL-MCNC: 106 MG/DL — HIGH (ref 70–99)
GLUCOSE SERPL-MCNC: 142 MG/DL — HIGH (ref 70–99)
GLUCOSE SERPL-MCNC: 142 MG/DL — HIGH (ref 70–99)
HCT VFR BLD CALC: 37.1 % — SIGNIFICANT CHANGE UP (ref 34.5–45)
HCT VFR BLD CALC: 37.1 % — SIGNIFICANT CHANGE UP (ref 34.5–45)
HCT VFR BLD CALC: 40.4 % — SIGNIFICANT CHANGE UP (ref 34.5–45)
HCT VFR BLD CALC: 40.4 % — SIGNIFICANT CHANGE UP (ref 34.5–45)
HGB BLD-MCNC: 11.3 G/DL — LOW (ref 11.5–15.5)
HGB BLD-MCNC: 11.3 G/DL — LOW (ref 11.5–15.5)
HGB BLD-MCNC: 12.6 G/DL — SIGNIFICANT CHANGE UP (ref 11.5–15.5)
HGB BLD-MCNC: 12.6 G/DL — SIGNIFICANT CHANGE UP (ref 11.5–15.5)
INR BLD: 1.22 RATIO — HIGH (ref 0.85–1.18)
INR BLD: 1.22 RATIO — HIGH (ref 0.85–1.18)
MCHC RBC-ENTMCNC: 29.7 PG — SIGNIFICANT CHANGE UP (ref 27–34)
MCHC RBC-ENTMCNC: 29.7 PG — SIGNIFICANT CHANGE UP (ref 27–34)
MCHC RBC-ENTMCNC: 30.1 PG — SIGNIFICANT CHANGE UP (ref 27–34)
MCHC RBC-ENTMCNC: 30.1 PG — SIGNIFICANT CHANGE UP (ref 27–34)
MCHC RBC-ENTMCNC: 30.5 GM/DL — LOW (ref 32–36)
MCHC RBC-ENTMCNC: 30.5 GM/DL — LOW (ref 32–36)
MCHC RBC-ENTMCNC: 31.2 GM/DL — LOW (ref 32–36)
MCHC RBC-ENTMCNC: 31.2 GM/DL — LOW (ref 32–36)
MCV RBC AUTO: 96.7 FL — SIGNIFICANT CHANGE UP (ref 80–100)
MCV RBC AUTO: 96.7 FL — SIGNIFICANT CHANGE UP (ref 80–100)
MCV RBC AUTO: 97.4 FL — SIGNIFICANT CHANGE UP (ref 80–100)
MCV RBC AUTO: 97.4 FL — SIGNIFICANT CHANGE UP (ref 80–100)
NRBC # BLD: 0 /100 WBCS — SIGNIFICANT CHANGE UP (ref 0–0)
NRBC # FLD: 0 K/UL — SIGNIFICANT CHANGE UP (ref 0–0)
PLATELET # BLD AUTO: 220 K/UL — SIGNIFICANT CHANGE UP (ref 150–400)
PLATELET # BLD AUTO: 220 K/UL — SIGNIFICANT CHANGE UP (ref 150–400)
PLATELET # BLD AUTO: 226 K/UL — SIGNIFICANT CHANGE UP (ref 150–400)
PLATELET # BLD AUTO: 226 K/UL — SIGNIFICANT CHANGE UP (ref 150–400)
POTASSIUM SERPL-MCNC: 4 MMOL/L — SIGNIFICANT CHANGE UP (ref 3.5–5.3)
POTASSIUM SERPL-MCNC: 4 MMOL/L — SIGNIFICANT CHANGE UP (ref 3.5–5.3)
POTASSIUM SERPL-MCNC: 4.4 MMOL/L — SIGNIFICANT CHANGE UP (ref 3.5–5.3)
POTASSIUM SERPL-MCNC: 4.4 MMOL/L — SIGNIFICANT CHANGE UP (ref 3.5–5.3)
POTASSIUM SERPL-SCNC: 4 MMOL/L — SIGNIFICANT CHANGE UP (ref 3.5–5.3)
POTASSIUM SERPL-SCNC: 4 MMOL/L — SIGNIFICANT CHANGE UP (ref 3.5–5.3)
POTASSIUM SERPL-SCNC: 4.4 MMOL/L — SIGNIFICANT CHANGE UP (ref 3.5–5.3)
POTASSIUM SERPL-SCNC: 4.4 MMOL/L — SIGNIFICANT CHANGE UP (ref 3.5–5.3)
PROTHROM AB SERPL-ACNC: 13.7 SEC — HIGH (ref 9.5–13)
PROTHROM AB SERPL-ACNC: 13.7 SEC — HIGH (ref 9.5–13)
RBC # BLD: 3.81 M/UL — SIGNIFICANT CHANGE UP (ref 3.8–5.2)
RBC # BLD: 3.81 M/UL — SIGNIFICANT CHANGE UP (ref 3.8–5.2)
RBC # BLD: 4.18 M/UL — SIGNIFICANT CHANGE UP (ref 3.8–5.2)
RBC # BLD: 4.18 M/UL — SIGNIFICANT CHANGE UP (ref 3.8–5.2)
RBC # FLD: 12.8 % — SIGNIFICANT CHANGE UP (ref 10.3–14.5)
RBC # FLD: 12.8 % — SIGNIFICANT CHANGE UP (ref 10.3–14.5)
RBC # FLD: 12.9 % — SIGNIFICANT CHANGE UP (ref 10.3–14.5)
RBC # FLD: 12.9 % — SIGNIFICANT CHANGE UP (ref 10.3–14.5)
RH IG SCN BLD-IMP: POSITIVE — SIGNIFICANT CHANGE UP
RH IG SCN BLD-IMP: POSITIVE — SIGNIFICANT CHANGE UP
SODIUM SERPL-SCNC: 139 MMOL/L — SIGNIFICANT CHANGE UP (ref 135–145)
WBC # BLD: 5.32 K/UL — SIGNIFICANT CHANGE UP (ref 3.8–10.5)
WBC # BLD: 5.32 K/UL — SIGNIFICANT CHANGE UP (ref 3.8–10.5)
WBC # BLD: 7.88 K/UL — SIGNIFICANT CHANGE UP (ref 3.8–10.5)
WBC # BLD: 7.88 K/UL — SIGNIFICANT CHANGE UP (ref 3.8–10.5)
WBC # FLD AUTO: 5.32 K/UL — SIGNIFICANT CHANGE UP (ref 3.8–10.5)
WBC # FLD AUTO: 5.32 K/UL — SIGNIFICANT CHANGE UP (ref 3.8–10.5)
WBC # FLD AUTO: 7.88 K/UL — SIGNIFICANT CHANGE UP (ref 3.8–10.5)
WBC # FLD AUTO: 7.88 K/UL — SIGNIFICANT CHANGE UP (ref 3.8–10.5)

## 2023-11-16 PROCEDURE — 27244 TREAT THIGH FRACTURE: CPT | Mod: LT

## 2023-11-16 PROCEDURE — 99232 SBSQ HOSP IP/OBS MODERATE 35: CPT

## 2023-11-16 DEVICE — SCREW CORT S-T 4.5X32MM: Type: IMPLANTABLE DEVICE | Site: LEFT | Status: FUNCTIONAL

## 2023-11-16 DEVICE — IMPLANTABLE DEVICE: Type: IMPLANTABLE DEVICE | Site: LEFT | Status: FUNCTIONAL

## 2023-11-16 DEVICE — SCREW CORT SELF TAP 4.5X40MM: Type: IMPLANTABLE DEVICE | Site: LEFT | Status: FUNCTIONAL

## 2023-11-16 DEVICE — GWIRE THRD 2.5X230MM: Type: IMPLANTABLE DEVICE | Site: LEFT | Status: FUNCTIONAL

## 2023-11-16 RX ORDER — SODIUM CHLORIDE 9 MG/ML
1000 INJECTION INTRAMUSCULAR; INTRAVENOUS; SUBCUTANEOUS
Refills: 0 | Status: DISCONTINUED | OUTPATIENT
Start: 2023-11-16 | End: 2023-11-18

## 2023-11-16 RX ORDER — POLYETHYLENE GLYCOL 3350 17 G/17G
17 POWDER, FOR SOLUTION ORAL DAILY
Refills: 0 | Status: DISCONTINUED | OUTPATIENT
Start: 2023-11-16 | End: 2023-11-18

## 2023-11-16 RX ORDER — SENNA PLUS 8.6 MG/1
2 TABLET ORAL AT BEDTIME
Refills: 0 | Status: DISCONTINUED | OUTPATIENT
Start: 2023-11-16 | End: 2023-11-18

## 2023-11-16 RX ORDER — HYDROMORPHONE HYDROCHLORIDE 2 MG/ML
0.5 INJECTION INTRAMUSCULAR; INTRAVENOUS; SUBCUTANEOUS
Refills: 0 | Status: DISCONTINUED | OUTPATIENT
Start: 2023-11-16 | End: 2023-11-16

## 2023-11-16 RX ORDER — OXYCODONE HYDROCHLORIDE 5 MG/1
2.5 TABLET ORAL EVERY 4 HOURS
Refills: 0 | Status: DISCONTINUED | OUTPATIENT
Start: 2023-11-16 | End: 2023-11-18

## 2023-11-16 RX ORDER — CEFAZOLIN SODIUM 1 G
1000 VIAL (EA) INJECTION EVERY 8 HOURS
Refills: 0 | Status: COMPLETED | OUTPATIENT
Start: 2023-11-16 | End: 2023-11-17

## 2023-11-16 RX ORDER — ENOXAPARIN SODIUM 100 MG/ML
30 INJECTION SUBCUTANEOUS EVERY 24 HOURS
Refills: 0 | Status: DISCONTINUED | OUTPATIENT
Start: 2023-11-16 | End: 2023-11-18

## 2023-11-16 RX ORDER — OXYCODONE HYDROCHLORIDE 5 MG/1
5 TABLET ORAL EVERY 4 HOURS
Refills: 0 | Status: DISCONTINUED | OUTPATIENT
Start: 2023-11-16 | End: 2023-11-18

## 2023-11-16 RX ORDER — ONDANSETRON 8 MG/1
4 TABLET, FILM COATED ORAL ONCE
Refills: 0 | Status: DISCONTINUED | OUTPATIENT
Start: 2023-11-16 | End: 2023-11-16

## 2023-11-16 RX ADMIN — AMLODIPINE BESYLATE 5 MILLIGRAM(S): 2.5 TABLET ORAL at 06:31

## 2023-11-16 RX ADMIN — SODIUM CHLORIDE 125 MILLILITER(S): 9 INJECTION INTRAMUSCULAR; INTRAVENOUS; SUBCUTANEOUS at 19:47

## 2023-11-16 NOTE — DIETITIAN INITIAL EVALUATION ADULT - EDUCATION DIETARY MODIFICATIONS
Importance of having well balanced, nutrient and protein dense foods. Strategies to increase kcal and protein intake./(1) partially meets; needs review/practice/verbalization

## 2023-11-16 NOTE — PROGRESS NOTE ADULT - SUBJECTIVE AND OBJECTIVE BOX
ORTHO ATTENDING POST OP    S/P ORIF  L HIP  WBAT  L  LE  lovenox 40 QD  venodynes  ancef 1 g x 24 h  OOB to chair in AM  rehab consult   f/u medicine  CBC in RR and AM

## 2023-11-16 NOTE — DIETITIAN INITIAL EVALUATION ADULT - ORAL INTAKE PTA/DIET HISTORY
Collateral obtained from son (John [phone]) at bedside. Per son, patient w/ no changes in appetite and PO intake. Eats good. Tries to have soft, moist foods at home. Regular Diet. No diet restriction followed. NKFA. Tried Oral nutrition supplement but did not like it. Patient currently ordered for Ensure plus but have not tried it. Alternative options such as Hormel shake and magic cup (reduced sugar)-as patient prefers less sweet offered and amenable for trial.

## 2023-11-16 NOTE — PROGRESS NOTE ADULT - PROBLEM SELECTOR PLAN 2
not on home meds  bp controlled on norvasc 5 mg qd started here  Cardiology consulted, no further testing prior to planned surgery

## 2023-11-16 NOTE — DIETITIAN INITIAL EVALUATION ADULT - ADD RECOMMEND
1. Monitor weights, labs, BM's, skin integrity, p.o. intake.   2. Please document % PO intake in nursing flowsheet.   3. Honor food and beverage preferences within diet restriction of patient in an effort to maximize level of nutrient intake.

## 2023-11-16 NOTE — DIETITIAN INITIAL EVALUATION ADULT - REASON FOR ADMISSION
Fracture of unspecified part of neck of unspecified femur, initial encounter for closed fracture    89 yo F with h/o HTN, hyperthyroid (not on meds), p/w fall with left hip fracture per chart review.

## 2023-11-16 NOTE — DIETITIAN INITIAL EVALUATION ADULT - PERTINENT LABORATORY DATA
11-16    139  |  104  |  22  ----------------------------<  106<H>  4.4   |  28  |  0.83    Ca    8.9      16 Nov 2023 05:39  Phos  3.3     11-15  Mg     1.90     11-15

## 2023-11-16 NOTE — PROGRESS NOTE ADULT - PROBLEM SELECTOR PLAN 1
-NWB LLE, bedrest  - MRI left hip (11/12) Acute comminuted intertrochanteric left hip fracture.  - Repeat MRI (11/13)  Bilateral greater trochanteric fractures. On the right, the   fracture does not extend to the midline of the proximal femur. On the   left, fracture extends just beyond the midline of the proximal femur   towards the lesser trochanter.  - Plan for OR today per orthopedic, ORIF L hip  - NPO resume diet after OR  Pt is medically optimized for OR, no need for further study, RCRI 1, 6% periop MACE  - hold prophylactic heparin   -dc plan home with home PT after surgery pending clinical course. Updated pt's son at bedside on 11/16

## 2023-11-16 NOTE — PROGRESS NOTE ADULT - SUBJECTIVE AND OBJECTIVE BOX
PATIENT SEEN AND EXAMINED BY JANAY PATEL M.D. ON :-11/16/23  DATE OF SERVICE:    11/16/23         Interim events noted,Labs ,Radiological studies and Cardiology tests reviewed .  DISCUSSED WITH ACP/MEDICAL RESIDENT ON PLAN OF CARE.    MR#[id number]  PATIENT [facility] COURSE: HPI:  89 yo f with listed h/o htn, thyroid disease . At time of my exam, pt sleeping, arousable, but does not want to participate in h&p. No one answering phone at  numbers listed in chart. Pt speaking lucidly, opening her eyes on command. Per ed/othro , pt sustained left intertrochanteric fracture. Seen by orthopedics here who upon reviewing CT and MR reading, are recommending bone scan in order to determine if pt requires surgery  (12 Nov 2023 23:45)      INTERIM EVENTS:Patient seen at bedside ,interim events noted.      PMH -reviewed admission note, no change since admission  HEART FAILURE: Acute[ ]Chronic[ ] Systolic[ ] Diastolic[ ] Combined Systolic and Diastolic[ ]  CAD[ ] CABG[ ] PCI[ ]  DEVICES[ ] PPM[ ] ICD[ ] ILR[ ]  ATRIAL FIBRILLATION[ ] Paroxysmal[ ] Permanent[ ] CHADS2-[  ]  VIVIANA[ ] CKD1[ ] CKD2[ ] CKD3[ ] CKD4[ ] ESRD[ ]  COPD[ ] HTN[ ]   DM[ ] Type1[ ] Type 2[ ]   CVA[ ] Paresis[ ]    AMBULATION: Assisted[ ] Cane/walker[ ] Independent[ ]    MEDICATIONS  (STANDING):  amLODIPine   Tablet 5 milliGRAM(s) Oral daily  ceFAZolin   IVPB 1000 milliGRAM(s) IV Intermittent every 8 hours  chlorhexidine 2% Cloths 1 Application(s) Topical daily  enoxaparin Injectable 30 milliGRAM(s) SubCutaneous every 24 hours  influenza  Vaccine (HIGH DOSE) 0.7 milliLiter(s) IntraMuscular once  polyethylene glycol 3350 17 Gram(s) Oral daily  senna 2 Tablet(s) Oral at bedtime  sodium chloride 0.9%. 1000 milliLiter(s) (125 mL/Hr) IV Continuous <Continuous>    MEDICATIONS  (PRN):  acetaminophen     Tablet .. 650 milliGRAM(s) Oral every 6 hours PRN Mild Pain (1 - 3), Moderate Pain (4 - 6)  oxyCODONE    IR 2.5 milliGRAM(s) Oral every 4 hours PRN Moderate Pain (4 - 6)  oxyCODONE    IR 5 milliGRAM(s) Oral every 4 hours PRN Severe Pain (7 - 10)            REVIEW OF SYSTEMS:  Constitutional: [ ] fever, [ ]weight loss,  [ ]fatigue [ ]weight gain  Eyes: [ ] visual changes  Respiratory: [ ]shortness of breath;  [ ] cough, [ ]wheezing, [ ]chills, [ ]hemoptysis  Cardiovascular: [ ] chest pain, [ ]palpitations, [ ]dizziness,  [ ]leg swelling[ ]orthopnea[ ]PND  Gastrointestinal: [ ] abdominal pain, [ ]nausea, [ ]vomiting,  [ ]diarrhea [ ]Constipation [ ]Melena  Genitourinary: [ ] dysuria, [ ] hematuria [ ]Knight  Neurologic: [ ] headaches [ ] tremors[ ]weakness [ ]Paralysis Right[ ] Left[ ]  Skin: [ ] itching, [ ]burning, [ ] rashes  Endocrine: [ ] heat or cold intolerance  Musculoskeletal: [ ] joint pain or swelling; [ ] muscle, back, or extremity pain  Psychiatric: [ ] depression, [ ]anxiety, [ ]mood swings, or [ ]difficulty sleeping  Hematologic: [ ] easy bruising, [ ] bleeding gums    [ ] All remaining systems negative except as per above.   [ ]Unable to obtain.  [x] No change in ROS since admission      Vital Signs Last 24 Hrs  T(C): 36.6 (16 Nov 2023 17:00), Max: 37.1 (16 Nov 2023 06:30)  T(F): 97.9 (16 Nov 2023 17:00), Max: 98.7 (16 Nov 2023 06:30)  HR: 74 (16 Nov 2023 17:15) (66 - 77)  BP: 131/63 (16 Nov 2023 17:15) (121/65 - 173/72)  BP(mean): 80 (16 Nov 2023 17:15) (73 - 98)  RR: 20 (16 Nov 2023 17:15) (13 - 20)  SpO2: 99% (16 Nov 2023 17:15) (96% - 100%)    Parameters below as of 16 Nov 2023 17:15  Patient On (Oxygen Delivery Method): room air      I&O's Summary    16 Nov 2023 07:01  -  16 Nov 2023 22:01  --------------------------------------------------------  IN: 400 mL / OUT: 0 mL / NET: 400 mL        PHYSICAL EXAM:  General: No acute distress BMI-  HEENT: EOMI, PERRL  Neck: Supple, [ ] JVD  Lungs: Equal air entry bilaterally; [ ] rales [ ] wheezing [ ] rhonchi  Heart: Regular rate and rhythm; [x ] murmur   2/6 [ x] systolic [ ] diastolic [ ] radiation[ ] rubs [ ]  gallops  Abdomen: Nontender, bowel sounds present  Extremities: No clubbing, cyanosis, [ ] edema [ ]Pulses  equal and intact  Nervous system:  Alert & Oriented X3, no focal deficits  Psychiatric: Normal affect  Skin: No rashes or lesions    LABS:  11-16    139  |  103  |  18  ----------------------------<  142<H>  4.0   |  27  |  0.76    Ca    9.1      16 Nov 2023 16:00  Phos  3.3     11-15  Mg     1.90     11-15      Creatinine Trend: 0.76<--, 0.83<--, 0.88<--, 0.96<--, 1.01<--, 0.96<--                        12.6   7.88  )-----------( 226      ( 16 Nov 2023 16:00 )             40.4     PT/INR - ( 16 Nov 2023 05:39 )   PT: 13.7 sec;   INR: 1.22 ratio         PTT - ( 16 Nov 2023 05:39 )  PTT:25.1 sec

## 2023-11-16 NOTE — DIETITIAN INITIAL EVALUATION ADULT - DIET TYPE
Liberalize diet and d/c DASH/TLC (cholesterol and sodium restricted). Continue Regular Diet to maximize po intake./regular

## 2023-11-16 NOTE — DIETITIAN NUTRITION RISK NOTIFICATION - TREATMENT: THE FOLLOWING DIET HAS BEEN RECOMMENDED
Diet, NPO after Midnight:      NPO Start Date: 15-Nov-2023,   NPO Start Time: 23:59 (11-15-23 @ 07:10) [Active]  Diet, DASH/TLC:   Sodium & Cholesterol Restricted  Supplement Feeding Modality:  Oral  Ensure Enlive Cans or Servings Per Day:  1       Frequency:  Two Times a day (11-14-23 @ 10:44) [Active]

## 2023-11-16 NOTE — DIETITIAN INITIAL EVALUATION ADULT - ENTER FROM (CAL/KG)
Non-compliant report chart audits for OSTEOPOROSIS SCREENING. Chart review completed for HM test overdue (mammograms, Colonoscopies, pap smears, DM labs, and/or EYE EXAMs)       DIS reviewed for test needed.  Dexa Scan      Outreach to patient in reference to dexa scan  RE:  Patient needs to schedule dexa scan.     Portal message sent          30

## 2023-11-16 NOTE — PROGRESS NOTE ADULT - SUBJECTIVE AND OBJECTIVE BOX
Dr. Cathryn Chairez  Pager [phone]    PROGRESS NOTE:     Patient is a 89y old  Female who presents with a chief complaint of Fracture of unspecified part of neck of unspecified femur, initial encounter for closed fracture    89 yo F with h/o HTN, hyperthyroid (not on meds), p/w fall with left hip fracture per chart review.    (16 Nov 2023 10:23)      SUBJECTIVE / OVERNIGHT EVENTS: denies significant hip pain while sat in chair , son at bedside  ADDITIONAL REVIEW OF SYSTEMS: afebrile, no chest pain/sob     MEDICATIONS  (STANDING):  amLODIPine   Tablet 5 milliGRAM(s) Oral daily  chlorhexidine 2% Cloths 1 Application(s) Topical daily  dextrose 5% + sodium chloride 0.45%. 1000 milliLiter(s) (60 mL/Hr) IV Continuous <Continuous>  influenza  Vaccine (HIGH DOSE) 0.7 milliLiter(s) IntraMuscular once    MEDICATIONS  (PRN):  acetaminophen     Tablet .. 650 milliGRAM(s) Oral every 6 hours PRN Mild Pain (1 - 3), Moderate Pain (4 - 6)      CAPILLARY BLOOD GLUCOSE        I&O's Summary      PHYSICAL EXAM:  Vital Signs Last 24 Hrs  T(C): 36.3 (16 Nov 2023 11:55), Max: 37.1 (16 Nov 2023 06:30)  T(F): 97.3 (16 Nov 2023 11:55), Max: 98.7 (16 Nov 2023 06:30)  HR: 73 (16 Nov 2023 11:55) (66 - 75)  BP: 173/72 (16 Nov 2023 11:55) (121/65 - 173/72)  BP(mean): 98 (16 Nov 2023 11:55) (98 - 98)  RR: 17 (16 Nov 2023 11:55) (17 - 18)  SpO2: 100% (16 Nov 2023 11:55) (96% - 100%)    Parameters below as of 16 Nov 2023 11:55  Patient On (Oxygen Delivery Method): room air      CONSTITUTIONAL: NAD, well-developed  RESPIRATORY: Normal respiratory effort; lungs are clear to auscultation bilaterally  CARDIOVASCULAR: Regular rate and rhythm, normal S1 and S2, no murmur/rub/gallop; No lower extremity edema; Peripheral pulses are 2+ bilaterally  ABDOMEN: Nontender to palpation, normoactive bowel sounds, no rebound/guarding; No hepatosplenomegaly  MUSCULOSKELETAL:  b/l hips have good ROM, L hip TTP lateral aspect of greater trochanter   PSYCH: A+O to person, place, and time; affect appropriate  Skin: dry flaky skins LE  LABS:                        11.3   5.32  )-----------( 220      ( 16 Nov 2023 05:39 )             37.1     11-16    139  |  104  |  22  ----------------------------<  106<H>  4.4   |  28  |  0.83    Ca    8.9      16 Nov 2023 05:39  Phos  3.3     11-15  Mg     1.90     11-15      PT/INR - ( 16 Nov 2023 05:39 )   PT: 13.7 sec;   INR: 1.22 ratio         PTT - ( 16 Nov 2023 05:39 )  PTT:25.1 sec      Urinalysis Basic - ( 16 Nov 2023 05:39 )    Color: x / Appearance: x / SG: x / pH: x  Gluc: 106 mg/dL / Ketone: x  / Bili: x / Urobili: x   Blood: x / Protein: x / Nitrite: x   Leuk Esterase: x / RBC: x / WBC x   Sq Epi: x / Non Sq Epi: x / Bacteria: x          RADIOLOGY & ADDITIONAL TESTS:  Results Reviewed:   Imaging Personally Reviewed:  Electrocardiogram Personally Reviewed:    COORDINATION OF CARE:  Care Discussed with Consultants/Other Providers [Y/N]:  Prior or Outpatient Records Reviewed [Y/N]:

## 2023-11-16 NOTE — DIETITIAN INITIAL EVALUATION ADULT - PERTINENT MEDS FT
MEDICATIONS  (STANDING):  amLODIPine   Tablet 5 milliGRAM(s) Oral daily  chlorhexidine 2% Cloths 1 Application(s) Topical daily  dextrose 5% + sodium chloride 0.45%. 1000 milliLiter(s) (60 mL/Hr) IV Continuous <Continuous>  influenza  Vaccine (HIGH DOSE) 0.7 milliLiter(s) IntraMuscular once    MEDICATIONS  (PRN):  acetaminophen     Tablet .. 650 milliGRAM(s) Oral every 6 hours PRN Mild Pain (1 - 3), Moderate Pain (4 - 6)

## 2023-11-16 NOTE — DIETITIAN INITIAL EVALUATION ADULT - ORAL NUTRITION SUPPLEMENTS
1. Recommend Ensure Plus (350 kcal, 20 gm protein per 8 oz serving) x 1 per day.  2. Foodservice department will provide Magic Cup Reduced Sugar BID and Hormel Shake x 1 per day.

## 2023-11-16 NOTE — BRIEF OPERATIVE NOTE - NSICDXBRIEFPROCEDURE_GEN_ALL_CORE_FT
PROCEDURES:  ORIF fracture of femoral neck fracture with dynamic hip screw (DHS) 16-Nov-2023 15:25:33  Vi Webb

## 2023-11-16 NOTE — CHART NOTE - NSCHARTNOTEFT_GEN_A_CORE
Notified by RN that patient refusing IV replacement and seems confused. Patient seen at bedside by provider. Patient pulling at curtain and yelling. Patient refusing to tell provider her name, states "you should know, you know all the secret information". Knows she is in [facility] and came for "Xrays". Patient refusing to participate in exam, appears neurologically intact, moving BL UE spontaneously, EOMI, good eye contact, clear speech.    Upon chart review, behavior seems consistent as described in H&P. Per patient's roommates family at bedside, the patient has been yelling and pulling at curtain for the past 3 nights. According to them, the patient is calm during the day but gets agitated/confused at night. Provider called and left message for John (son) [phone] for more collateral. Patient appears to be sundowning at this time, which appears to be consistent through out admission. No acute neurologic change/deficits, afebrile, FS WNL. Will encourage sleep hygiene and redirect as able. Will ensure patient safety. Will continue to monitor and support patient. Notified by RN that patient refusing IV replacement and seems confused. Patient seen at bedside by provider. Patient pulling at curtain and yelling. Patient refusing to tell provider her name, states "you should know, you know all the secret information". Knows she is in [facility] and came for "Xrays". Patient refusing to participate in exam, appears neurologically intact, moving BL UE spontaneously, EOMI, good eye contact, clear speech.    Upon chart review, behavior seems consistent as described in H&P. Per patient's roommates family at bedside, the patient has been yelling and pulling at curtain for the past 3 nights. According to them, the patient is calm during the day but gets agitated/confused at night. Provider called and left message for John (son) [phone] for more collateral. Patient appears to be sundowning at this time, which appears to be consistent through out admission. No acute neurologic change/deficits. Will encourage sleep hygiene and redirect as able. Will ensure patient safety. Will continue to monitor and support patient. Notified by RN that patient refusing IV replacement and seems confused. Patient seen at bedside by provider. Patient pulling at curtain and yelling. Patient refusing to tell provider her name, states "you should know, you know all the secret information". Knows she is in [facility] and came for "Xrays". Patient refusing to participate in exam, appears neurologically intact, moving BL UE spontaneously, EOMI, good eye contact, clear speech.    Upon chart review, behavior seems consistent as described in H&P. Per patient's roommates family at bedside, the patient has been yelling and pulling at curtain for the past 3 nights. According to them, the patient is calm during the day but gets agitated/confused at night. Provider called and left message for John (son) [phone] for more collateral. Patient appears to be sundowning at this time, which appears to be consistent through out admission. No acute neurologic change/deficits. Awaiting VS and FS as ordered. Will encourage sleep hygiene and redirect as able. Will ensure patient safety. Will continue to monitor and support patient. Notified by RN that patient refusing IV replacement and seems confused. Patient seen at bedside by provider. Patient pulling at curtain and yelling. Patient refusing to tell provider her name, states "you should know, you know all the secret information". Knows she is in [facility] and came for "Xrays". Patient refusing to participate in exam, appears neurologically intact, moving BL UE spontaneously, EOMI, good eye contact, clear speech.    Upon chart review, behavior seems consistent as described in H&P. Per patient's roommates family at bedside, the patient has been yelling and pulling at curtain for the past 3 nights. According to them, the patient is calm during the day but gets agitated/confused at night. Provider called and left message for John (son) [phone] for more collateral. Patient appears to be sundowning at this time, which appears to be consistent through out admission. No acute neurologic change/deficits. FS WNL. Will encourage sleep hygiene and redirect as able. Will ensure patient safety. Will continue to monitor and support patient.

## 2023-11-16 NOTE — PROGRESS NOTE ADULT - SUBJECTIVE AND OBJECTIVE BOX
ORTHO PROGRESS NOTE     Pt seen and examined at bedside, denies SOB, CP, Dizziness. N/V/D /HA.  No significant overnight events. Pain well controlled.    Vital Signs Last 24 Hrs  T(C): 37.1 (16 Nov 2023 06:30), Max: 37.1 (16 Nov 2023 06:30)  T(F): 98.7 (16 Nov 2023 06:30), Max: 98.7 (16 Nov 2023 06:30)  HR: 75 (16 Nov 2023 06:30) (68 - 75)  BP: 157/64 (16 Nov 2023 06:30) (127/68 - 157/64)  BP(mean): --  RR: 18 (16 Nov 2023 06:30) (18 - 18)  SpO2: 100% (16 Nov 2023 06:30) (96% - 100%)    Parameters below as of 16 Nov 2023 06:30  Patient On (Oxygen Delivery Method): room air        Gen: NAD, alert and oriented  Resp: Unlabored breathing  LLE: skin intact       SILT DP/SP/ Sandip/Saph/tib       5/5 EHL 5/5 FHL 5/5 TA 5/5 Gastroc 5/5 IP        DP+,        soft compartments, no calf ttp,       Labs:  CBC Full  -  ( 16 Nov 2023 05:39 )  WBC Count : 5.32 K/uL  RBC Count : 3.81 M/uL  Hemoglobin : 11.3 g/dL  Hematocrit : 37.1 %  Platelet Count - Automated : 220 K/uL  Mean Cell Volume : 97.4 fL  Mean Cell Hemoglobin : 29.7 pg  Mean Cell Hemoglobin Concentration : 30.5 gm/dL  Auto Neutrophil # : x  Auto Lymphocyte # : x  Auto Monocyte # : x  Auto Eosinophil # : x  Auto Basophil # : x  Auto Neutrophil % : x  Auto Lymphocyte % : x  Auto Monocyte % : x  Auto Eosinophil % : x  Auto Basophil % : x      11-16    139  |  104  |  22  ----------------------------<  106<H>  4.4   |  28  |  0.83    Ca    8.9      16 Nov 2023 05:39  Phos  3.3     11-15  Mg     1.90     11-15        A/P  Pt is a 89y Female w/ L greater troch fx. MRI L hip read as intertrochanteric extension. Plan for OR today    - Pain control/ Analgesia  - NWB LLE  - NPO  IVF  - Hold DVT PPX  - Please obtain medical clearance  - FU preop labs

## 2023-11-16 NOTE — DIETITIAN INITIAL EVALUATION ADULT - PROBLEM SELECTOR PLAN 1
-NWB LLE, bedrest  -Pain control as needed   -pain control (no pain meds given thus far in ed )  -ice/cold compress  -ortho to follow and plan to  finalize pending bone scan results  -npo save for sips of water/meds pending decision for surgery    ADDEND  art 230am pt confused but calm and redirectable; reports "some kind of mistake that Im here". Unable to reach son John at this time; will reattempt later in am

## 2023-11-16 NOTE — DIETITIAN INITIAL EVALUATION ADULT - NS FNS DIET ORDER
Diet, NPO after Midnight:      NPO Start Date: 15-Nov-2023,   NPO Start Time: 23:59 (11-15-23 @ 07:10)  Diet, DASH/TLC:   Sodium & Cholesterol Restricted  Supplement Feeding Modality:  Oral  Ensure Enlive Cans or Servings Per Day:  1       Frequency:  Two Times a day (11-14-23 @ 10:44)

## 2023-11-16 NOTE — DIETITIAN INITIAL EVALUATION ADULT - OTHER INFO
Patient endorses good appetite and PO intake, consuming >75% of meals per nursing flowsheet documentation. NPO for OR with ortho today. Denies any nausea/vomiting/diarrhea/constipation or difficulty chewing and swallowing. +BM yesterday.  Per family, weight have been in the range of 100-110lbs over the year. Current weight 48.3kg/106lbs on 11/12. No significant weight change noted. Patient w/ overt physical sign of muscle wasting and fat loss noted.

## 2023-11-16 NOTE — DIETITIAN NUTRITION RISK NOTIFICATION - BUCCAL DEPLETION IS
Spoke to patient for update on symptoms  Lost of small/taste has improved somewhat.  Still has mild nasal congestion.    Denies fever, nausea, vomiting, cough, SOB.    Has been in quarantine since May 4    Dr Brown - patient asking when she can go back to work.  Patient states she is not in a hurry.  Okay for letter?        severe

## 2023-11-16 NOTE — PRE-OP CHECKLIST - BMI (KG/M2)
Patient needs to get his MRI of Thoracic and his Bone scan sent to [facility] call the patient.
17.7

## 2023-11-16 NOTE — PROGRESS NOTE ADULT - SUBJECTIVE AND OBJECTIVE BOX
ORTHO POC      Patient resting comfortably without complaint s/p left DHS today     Vital Signs Last 24 Hrs  T(C): 36.2 (16 Nov 2023 16:00), Max: 37.1 (16 Nov 2023 06:30)  T(F): 97.1 (16 Nov 2023 16:00), Max: 98.7 (16 Nov 2023 06:30)  HR: 72 (16 Nov 2023 16:45) (66 - 77)  BP: 137/51 (16 Nov 2023 16:45) (121/65 - 173/72)  BP(mean): 73 (16 Nov 2023 16:45) (73 - 98)  RR: 13 (16 Nov 2023 16:45) (13 - 19)  SpO2: 97% (16 Nov 2023 16:45) (96% - 100%)    Parameters below as of 16 Nov 2023 16:45  Patient On (Oxygen Delivery Method): room air        left hip PE : dressing clean/dry/ intact              LE:  EHL/GC/TA 5/5                  sensory intact                   DP pulse 2+    Labs :                      12.6   7.88  )-----------( 226      ( 16 Nov 2023 16:00 )             40.4                 11-16    139  |  103  |  18  ----------------------------<  142<H>  4.0   |  27  |  0.76    Ca    9.1      16 Nov 2023 16:00  Phos  3.3     11-15  Mg     1.90     11-15        U/O:  DTV    A/P: Stable postop            PT- WBAT            DVT prophylaxis- venodynes/ Lovenox           Pain Control            Incentive Spirometer            Antibiotics x 24 hr            Follow up AM labs

## 2023-11-17 ENCOUNTER — TRANSCRIPTION ENCOUNTER (OUTPATIENT)
Age: 88
End: 2023-11-17

## 2023-11-17 LAB
ANION GAP SERPL CALC-SCNC: 12 MMOL/L — SIGNIFICANT CHANGE UP (ref 7–14)
ANION GAP SERPL CALC-SCNC: 12 MMOL/L — SIGNIFICANT CHANGE UP (ref 7–14)
BASOPHILS # BLD AUTO: 0.01 K/UL — SIGNIFICANT CHANGE UP (ref 0–0.2)
BASOPHILS # BLD AUTO: 0.01 K/UL — SIGNIFICANT CHANGE UP (ref 0–0.2)
BASOPHILS NFR BLD AUTO: 0.1 % — SIGNIFICANT CHANGE UP (ref 0–2)
BASOPHILS NFR BLD AUTO: 0.1 % — SIGNIFICANT CHANGE UP (ref 0–2)
BUN SERPL-MCNC: 22 MG/DL — SIGNIFICANT CHANGE UP (ref 7–23)
BUN SERPL-MCNC: 22 MG/DL — SIGNIFICANT CHANGE UP (ref 7–23)
CALCIUM SERPL-MCNC: 9.3 MG/DL — SIGNIFICANT CHANGE UP (ref 8.4–10.5)
CALCIUM SERPL-MCNC: 9.3 MG/DL — SIGNIFICANT CHANGE UP (ref 8.4–10.5)
CHLORIDE SERPL-SCNC: 104 MMOL/L — SIGNIFICANT CHANGE UP (ref 98–107)
CHLORIDE SERPL-SCNC: 104 MMOL/L — SIGNIFICANT CHANGE UP (ref 98–107)
CO2 SERPL-SCNC: 26 MMOL/L — SIGNIFICANT CHANGE UP (ref 22–31)
CO2 SERPL-SCNC: 26 MMOL/L — SIGNIFICANT CHANGE UP (ref 22–31)
CREAT SERPL-MCNC: 0.84 MG/DL — SIGNIFICANT CHANGE UP (ref 0.5–1.3)
CREAT SERPL-MCNC: 0.84 MG/DL — SIGNIFICANT CHANGE UP (ref 0.5–1.3)
EGFR: 64 ML/MIN/1.73M2 — SIGNIFICANT CHANGE UP
EGFR: 64 ML/MIN/1.73M2 — SIGNIFICANT CHANGE UP
EOSINOPHIL # BLD AUTO: 0 K/UL — SIGNIFICANT CHANGE UP (ref 0–0.5)
EOSINOPHIL # BLD AUTO: 0 K/UL — SIGNIFICANT CHANGE UP (ref 0–0.5)
EOSINOPHIL NFR BLD AUTO: 0 % — SIGNIFICANT CHANGE UP (ref 0–6)
EOSINOPHIL NFR BLD AUTO: 0 % — SIGNIFICANT CHANGE UP (ref 0–6)
GLUCOSE SERPL-MCNC: 145 MG/DL — HIGH (ref 70–99)
GLUCOSE SERPL-MCNC: 145 MG/DL — HIGH (ref 70–99)
HCT VFR BLD CALC: 35.4 % — SIGNIFICANT CHANGE UP (ref 34.5–45)
HCT VFR BLD CALC: 35.4 % — SIGNIFICANT CHANGE UP (ref 34.5–45)
HGB BLD-MCNC: 10.8 G/DL — LOW (ref 11.5–15.5)
HGB BLD-MCNC: 10.8 G/DL — LOW (ref 11.5–15.5)
IANC: 5.57 K/UL — SIGNIFICANT CHANGE UP (ref 1.8–7.4)
IANC: 5.57 K/UL — SIGNIFICANT CHANGE UP (ref 1.8–7.4)
IMM GRANULOCYTES NFR BLD AUTO: 0.4 % — SIGNIFICANT CHANGE UP (ref 0–0.9)
IMM GRANULOCYTES NFR BLD AUTO: 0.4 % — SIGNIFICANT CHANGE UP (ref 0–0.9)
LYMPHOCYTES # BLD AUTO: 0.66 K/UL — LOW (ref 1–3.3)
LYMPHOCYTES # BLD AUTO: 0.66 K/UL — LOW (ref 1–3.3)
LYMPHOCYTES # BLD AUTO: 9.8 % — LOW (ref 13–44)
LYMPHOCYTES # BLD AUTO: 9.8 % — LOW (ref 13–44)
MCHC RBC-ENTMCNC: 29.8 PG — SIGNIFICANT CHANGE UP (ref 27–34)
MCHC RBC-ENTMCNC: 29.8 PG — SIGNIFICANT CHANGE UP (ref 27–34)
MCHC RBC-ENTMCNC: 30.5 GM/DL — LOW (ref 32–36)
MCHC RBC-ENTMCNC: 30.5 GM/DL — LOW (ref 32–36)
MCV RBC AUTO: 97.8 FL — SIGNIFICANT CHANGE UP (ref 80–100)
MCV RBC AUTO: 97.8 FL — SIGNIFICANT CHANGE UP (ref 80–100)
MONOCYTES # BLD AUTO: 0.49 K/UL — SIGNIFICANT CHANGE UP (ref 0–0.9)
MONOCYTES # BLD AUTO: 0.49 K/UL — SIGNIFICANT CHANGE UP (ref 0–0.9)
MONOCYTES NFR BLD AUTO: 7.2 % — SIGNIFICANT CHANGE UP (ref 2–14)
MONOCYTES NFR BLD AUTO: 7.2 % — SIGNIFICANT CHANGE UP (ref 2–14)
NEUTROPHILS # BLD AUTO: 5.57 K/UL — SIGNIFICANT CHANGE UP (ref 1.8–7.4)
NEUTROPHILS # BLD AUTO: 5.57 K/UL — SIGNIFICANT CHANGE UP (ref 1.8–7.4)
NEUTROPHILS NFR BLD AUTO: 82.5 % — HIGH (ref 43–77)
NEUTROPHILS NFR BLD AUTO: 82.5 % — HIGH (ref 43–77)
NRBC # BLD: 0 /100 WBCS — SIGNIFICANT CHANGE UP (ref 0–0)
NRBC # BLD: 0 /100 WBCS — SIGNIFICANT CHANGE UP (ref 0–0)
NRBC # FLD: 0 K/UL — SIGNIFICANT CHANGE UP (ref 0–0)
NRBC # FLD: 0 K/UL — SIGNIFICANT CHANGE UP (ref 0–0)
PLATELET # BLD AUTO: 236 K/UL — SIGNIFICANT CHANGE UP (ref 150–400)
PLATELET # BLD AUTO: 236 K/UL — SIGNIFICANT CHANGE UP (ref 150–400)
POTASSIUM SERPL-MCNC: 5.1 MMOL/L — SIGNIFICANT CHANGE UP (ref 3.5–5.3)
POTASSIUM SERPL-MCNC: 5.1 MMOL/L — SIGNIFICANT CHANGE UP (ref 3.5–5.3)
POTASSIUM SERPL-SCNC: 5.1 MMOL/L — SIGNIFICANT CHANGE UP (ref 3.5–5.3)
POTASSIUM SERPL-SCNC: 5.1 MMOL/L — SIGNIFICANT CHANGE UP (ref 3.5–5.3)
RBC # BLD: 3.62 M/UL — LOW (ref 3.8–5.2)
RBC # BLD: 3.62 M/UL — LOW (ref 3.8–5.2)
RBC # FLD: 12.9 % — SIGNIFICANT CHANGE UP (ref 10.3–14.5)
RBC # FLD: 12.9 % — SIGNIFICANT CHANGE UP (ref 10.3–14.5)
SODIUM SERPL-SCNC: 142 MMOL/L — SIGNIFICANT CHANGE UP (ref 135–145)
SODIUM SERPL-SCNC: 142 MMOL/L — SIGNIFICANT CHANGE UP (ref 135–145)
WBC # BLD: 6.76 K/UL — SIGNIFICANT CHANGE UP (ref 3.8–10.5)
WBC # BLD: 6.76 K/UL — SIGNIFICANT CHANGE UP (ref 3.8–10.5)
WBC # FLD AUTO: 6.76 K/UL — SIGNIFICANT CHANGE UP (ref 3.8–10.5)
WBC # FLD AUTO: 6.76 K/UL — SIGNIFICANT CHANGE UP (ref 3.8–10.5)

## 2023-11-17 PROCEDURE — 99232 SBSQ HOSP IP/OBS MODERATE 35: CPT

## 2023-11-17 RX ORDER — SODIUM ZIRCONIUM CYCLOSILICATE 10 G/10G
5 POWDER, FOR SUSPENSION ORAL ONCE
Refills: 0 | Status: COMPLETED | OUTPATIENT
Start: 2023-11-17 | End: 2023-11-17

## 2023-11-17 RX ORDER — CALAMINE AND ZINC OXIDE AND PHENOL 160; 10 MG/ML; MG/ML
1 LOTION TOPICAL THREE TIMES A DAY
Refills: 0 | Status: DISCONTINUED | OUTPATIENT
Start: 2023-11-17 | End: 2023-11-18

## 2023-11-17 RX ADMIN — ENOXAPARIN SODIUM 30 MILLIGRAM(S): 100 INJECTION SUBCUTANEOUS at 21:40

## 2023-11-17 RX ADMIN — CHLORHEXIDINE GLUCONATE 1 APPLICATION(S): 213 SOLUTION TOPICAL at 12:52

## 2023-11-17 RX ADMIN — SODIUM ZIRCONIUM CYCLOSILICATE 5 GRAM(S): 10 POWDER, FOR SUSPENSION ORAL at 12:52

## 2023-11-17 RX ADMIN — CALAMINE AND ZINC OXIDE AND PHENOL 1 APPLICATION(S): 160; 10 LOTION TOPICAL at 17:36

## 2023-11-17 RX ADMIN — Medication 100 MILLIGRAM(S): at 14:37

## 2023-11-17 RX ADMIN — Medication 100 MILLIGRAM(S): at 06:49

## 2023-11-17 RX ADMIN — POLYETHYLENE GLYCOL 3350 17 GRAM(S): 17 POWDER, FOR SOLUTION ORAL at 12:52

## 2023-11-17 NOTE — DISCHARGE NOTE PROVIDER - HOSPITAL COURSE
89 yo F with h/o HTN, hyperthyroid (not on meds), p/w fall with left hip fracture      Problem/Plan - 1:  ·  Problem: Hip fracture.   ·  Plan: -NWB LLE, bedrest  - MRI left hip (11/12) Acute comminuted intertrochanteric left hip fracture.  - Repeat MRI (11/13)  Bilateral greater trochanteric fractures. On the right, the   fracture does not extend to the midline of the proximal femur. On the   left, fracture extends just beyond the midline of the proximal femur   towards the lesser trochanter.  -  s/p L hip ORIF with dynamic hip screw (DHS) on 11/16, outpt f/u Dr. Jagdeep Gutierrez  - PT/OT recs home PT/OT  - dispo: DC home tomorrow with home PT/OT, d/w SW, f/u CM  Updated pt's son at bedside on 11/17.     Problem/Plan - 2:  ·  Problem: HTN (hypertension).   ·  Plan: not on home meds  bp controlled on norvasc 5 mg qd started here  cardiology following.     Problem/Plan - 3:  ·  Problem: Other disorders of thyroid.   ·  Plan: not on any thyroid meds per son  TSH 0.43, FT4 1.2.   89 yo F with h/o HTN, hyperthyroid (not on meds), p/w fall with left hip fracture      Problem/Plan - 1:  ·  Problem: Hip fracture.   ·  Plan: -NWB LLE, bedrest  - MRI left hip (11/12) Acute comminuted intertrochanteric left hip fracture.  - Repeat MRI (11/13)  Bilateral greater trochanteric fractures. On the right, the   fracture does not extend to the midline of the proximal femur. On the   left, fracture extends just beyond the midline of the proximal femur   towards the lesser trochanter.  -  s/p L hip ORIF with dynamic hip screw (DHS) on 11/16, outpt f/u Dr. Jagdeep Gutierrez  - PT/OT recs home PT/OT  - dispo: DC home 11/18 with home PT/OT, d/w SW, f/u CM  Updated pt's son at bedside on 11/17.     Problem/Plan - 2:  ·  Problem: HTN (hypertension).   ·  Plan: not on home meds  bp controlled on norvasc 5 mg qd started here  cardiology following.     Problem/Plan - 3:  ·  Problem: Other disorders of thyroid.   ·  Plan: not on any thyroid meds per son  TSH 0.43, FT4 1.2.

## 2023-11-17 NOTE — OCCUPATIONAL THERAPY INITIAL EVALUATION ADULT - GENERAL OBSERVATIONS, REHAB EVAL
Patient received semisupine in bed in NAD. +IV . Son bedside. Patient agrees to participate in OT evaluation. /55mmHg . Patient left semisupine in bed in NAD. Patient tolerated OT evaluation well.

## 2023-11-17 NOTE — OCCUPATIONAL THERAPY INITIAL EVALUATION ADULT - PRECAUTIONS/LIMITATIONS, REHAB EVAL
left hip precautions/surgical precautions aspiration precautions/fall precautions/left hip precautions/surgical precautions

## 2023-11-17 NOTE — DISCHARGE NOTE PROVIDER - NSDCMRMEDTOKEN_GEN_ALL_CORE_FT
aspirin 81 mg oral delayed release tablet: 1 tab(s) orally once a day   acetaminophen 325 mg oral tablet: 2 tab(s) orally every 6 hours As needed Mild Pain (1 - 3), Moderate Pain (4 - 6)  amLODIPine 5 mg oral tablet: 1 tab(s) orally once a day  calamine topical lotion: 1 Apply topically to affected area 3 times a day  polyethylene glycol 3350 oral powder for reconstitution: 17 gram(s) orally once a day  senna leaf extract oral tablet: 2 tab(s) orally once a day (at bedtime)

## 2023-11-17 NOTE — PROGRESS NOTE ADULT - SUBJECTIVE AND OBJECTIVE BOX
Dr. Cathryn Chairez  Pager [phone]    PROGRESS NOTE:     Patient is a 89y old  Female who presents with a chief complaint of hip fx (17 Nov 2023 06:46)      SUBJECTIVE / OVERNIGHT EVENTS: pt doing well postop, pain controlled  ADDITIONAL REVIEW OF SYSTEMS: ambulated with PT today, recs home PT/OT    MEDICATIONS  (STANDING):  amLODIPine   Tablet 5 milliGRAM(s) Oral daily  calamine/zinc oxide Lotion 1 Application(s) Topical three times a day  ceFAZolin   IVPB 1000 milliGRAM(s) IV Intermittent every 8 hours  chlorhexidine 2% Cloths 1 Application(s) Topical daily  enoxaparin Injectable 30 milliGRAM(s) SubCutaneous every 24 hours  influenza  Vaccine (HIGH DOSE) 0.7 milliLiter(s) IntraMuscular once  polyethylene glycol 3350 17 Gram(s) Oral daily  senna 2 Tablet(s) Oral at bedtime  sodium chloride 0.9%. 1000 milliLiter(s) (125 mL/Hr) IV Continuous <Continuous>    MEDICATIONS  (PRN):  acetaminophen     Tablet .. 650 milliGRAM(s) Oral every 6 hours PRN Mild Pain (1 - 3), Moderate Pain (4 - 6)  oxyCODONE    IR 2.5 milliGRAM(s) Oral every 4 hours PRN Moderate Pain (4 - 6)  oxyCODONE    IR 5 milliGRAM(s) Oral every 4 hours PRN Severe Pain (7 - 10)      CAPILLARY BLOOD GLUCOSE      POCT Blood Glucose.: 197 mg/dL (16 Nov 2023 23:11)    I&O's Summary    16 Nov 2023 07:01  -  17 Nov 2023 07:00  --------------------------------------------------------  IN: 400 mL / OUT: 0 mL / NET: 400 mL        PHYSICAL EXAM:  Vital Signs Last 24 Hrs  T(C): 36.7 (17 Nov 2023 13:00), Max: 36.7 (17 Nov 2023 13:00)  T(F): 98 (17 Nov 2023 13:00), Max: 98 (17 Nov 2023 13:00)  HR: 76 (17 Nov 2023 13:00) (71 - 78)  BP: 107/51 (17 Nov 2023 13:00) (107/51 - 168/61)  BP(mean): 80 (16 Nov 2023 17:15) (73 - 87)  RR: 18 (17 Nov 2023 13:00) (13 - 20)  SpO2: 97% (17 Nov 2023 13:00) (96% - 100%)    Parameters below as of 17 Nov 2023 13:00  Patient On (Oxygen Delivery Method): room air      CONSTITUTIONAL: NAD, well-developed  RESPIRATORY: Normal respiratory effort; lungs are clear to auscultation bilaterally  CARDIOVASCULAR: Regular rate and rhythm, normal S1 and S2, no murmur/rub/gallop; No lower extremity edema; Peripheral pulses are 2+ bilaterally  ABDOMEN: Nontender to palpation, normoactive bowel sounds, no rebound/guarding; No hepatosplenomegaly  MUSCULOSKELETAL:  b/l hips have good ROM, L hip wound dressing   PSYCH: A+O to person, place, and time; affect appropriate  Skin: dry flaky skins LE  LABS:                        10.8   6.76  )-----------( 236      ( 17 Nov 2023 06:45 )             35.4     11-17    142  |  104  |  22  ----------------------------<  145<H>  5.1   |  26  |  0.84    Ca    9.3      17 Nov 2023 06:45      PT/INR - ( 16 Nov 2023 05:39 )   PT: 13.7 sec;   INR: 1.22 ratio         PTT - ( 16 Nov 2023 05:39 )  PTT:25.1 sec      Urinalysis Basic - ( 17 Nov 2023 06:45 )    Color: x / Appearance: x / SG: x / pH: x  Gluc: 145 mg/dL / Ketone: x  / Bili: x / Urobili: x   Blood: x / Protein: x / Nitrite: x   Leuk Esterase: x / RBC: x / WBC x   Sq Epi: x / Non Sq Epi: x / Bacteria: x          RADIOLOGY & ADDITIONAL TESTS:  Results Reviewed:   Imaging Personally Reviewed:  Electrocardiogram Personally Reviewed:    COORDINATION OF CARE:  Care Discussed with Consultants/Other Providers [Y/N]:  Prior or Outpatient Records Reviewed [Y/N]:

## 2023-11-17 NOTE — OCCUPATIONAL THERAPY INITIAL EVALUATION ADULT - PERTINENT HX OF CURRENT PROBLEM, REHAB EVAL
89 year old female with history of HTN, hyperthyroid (not on meds), s/p fall with left hip fracture. Now s/p  ORIF Left hip using DHS on 11/16/23.

## 2023-11-17 NOTE — DISCHARGE NOTE NURSING/CASE MANAGEMENT/SOCIAL WORK - PATIENT PORTAL LINK FT
You can access the FollowMyHealth Patient Portal offered by [facility] by registering at the following website: [email or web address]. By joining [employer]’s FollowMyHealth portal, you will also be able to view your health information using other applications (apps) compatible with our system.

## 2023-11-17 NOTE — PROGRESS NOTE ADULT - SUBJECTIVE AND OBJECTIVE BOX
Orthopedic Surgery Progress Note     S: Patient seen and examined today. Very confused overnight. Pain is well controlled. Denies f/c, chest pain, shortness of breath, dizziness.    MEDICATIONS  (STANDING):  amLODIPine   Tablet 5 milliGRAM(s) Oral daily  ceFAZolin   IVPB 1000 milliGRAM(s) IV Intermittent every 8 hours  chlorhexidine 2% Cloths 1 Application(s) Topical daily  enoxaparin Injectable 30 milliGRAM(s) SubCutaneous every 24 hours  influenza  Vaccine (HIGH DOSE) 0.7 milliLiter(s) IntraMuscular once  polyethylene glycol 3350 17 Gram(s) Oral daily  senna 2 Tablet(s) Oral at bedtime  sodium chloride 0.9%. 1000 milliLiter(s) (125 mL/Hr) IV Continuous <Continuous>    MEDICATIONS  (PRN):  acetaminophen     Tablet .. 650 milliGRAM(s) Oral every 6 hours PRN Mild Pain (1 - 3), Moderate Pain (4 - 6)  oxyCODONE    IR 2.5 milliGRAM(s) Oral every 4 hours PRN Moderate Pain (4 - 6)  oxyCODONE    IR 5 milliGRAM(s) Oral every 4 hours PRN Severe Pain (7 - 10)      Physical Exam:  Gen: confused and agitated  Left Lower Extremity:  aquacel c/d/i  moving lower extremity spontaneously but noncompliant w motor/sensory exam  Compartments soft/non-tender  Toes warm, Cap refill brisk/warm and perfused, +DP/PT pulse         Vital Signs Last 24 Hrs  T(C): 36.6 (16 Nov 2023 17:00), Max: 36.6 (16 Nov 2023 17:00)  T(F): 97.9 (16 Nov 2023 17:00), Max: 97.9 (16 Nov 2023 17:00)  HR: 74 (16 Nov 2023 17:15) (66 - 77)  BP: 131/63 (16 Nov 2023 17:15) (121/65 - 173/72)  BP(mean): 80 (16 Nov 2023 17:15) (73 - 98)  RR: 20 (16 Nov 2023 17:15) (13 - 20)  SpO2: 99% (16 Nov 2023 17:15) (96% - 100%)    Parameters below as of 16 Nov 2023 17:15  Patient On (Oxygen Delivery Method): room air        11-16-23 @ 07:01  -  11-17-23 @ 06:46  --------------------------------------------------------  IN: 400 mL / OUT: 0 mL / NET: 400 mL          LABS:                        12.6   7.88  )-----------( 226      ( 16 Nov 2023 16:00 )             40.4     11-16    139  |  103  |  18  ----------------------------<  142<H>  4.0   |  27  |  0.76    Ca    9.1      16 Nov 2023 16:00  Phos  3.3     11-15  Mg     1.90     11-15

## 2023-11-17 NOTE — PROGRESS NOTE ADULT - PROBLEM SELECTOR PLAN 1
-NWB LLE, bedrest  - MRI left hip (11/12) Acute comminuted intertrochanteric left hip fracture.  - Repeat MRI (11/13)  Bilateral greater trochanteric fractures. On the right, the   fracture does not extend to the midline of the proximal femur. On the   left, fracture extends just beyond the midline of the proximal femur   towards the lesser trochanter.  -  s/p L hip ORIF with dynamic hip screw (DHS) on 11/16, outpt f/u Dr. Jagdeep Gutierrez  - PT/OT recs home PT/OT  - dispo: DC home tomorrow with home PT/OT, d/w , f/u CM  Updated pt's son at bedside on 11/17

## 2023-11-17 NOTE — DISCHARGE NOTE NURSING/CASE MANAGEMENT/SOCIAL WORK - NSDCPEFALRISK_GEN_ALL_CORE
For information on Fall & Injury Prevention, visit: [email or web address] OR  [email or web address] OR  https://www.cdc.gov/steadi/patient.html

## 2023-11-17 NOTE — PROGRESS NOTE ADULT - ATTENDING COMMENTS
Patient seen and examined. Mariel White is a 89 year old female with a left greater trochanter fracture. MRI was performed overnight. No acute events overnight.        ACC: [id number] EXAM:  MR PELVIS   ORDERED BY: MATT CUELLAR     ACC: [id number] EXAM:  MR HIP LT   ORDERED BY: AAKASH DIGEO     PROCEDURE DATE:  11/14/2023    INTERPRETATION:  LEFT HIP AND PELVIC MRI    CLINICAL INDICATION: Bilateral greater trochanteric fractures. Evaluate   for intertrochanteric extension.  TECHNIQUE: Multiplanar, multisequence MRI was obtained of the left hip   and pelvis    FINDINGS:    Evaluation of the right hip demonstrates patchy bone marrow edema within   the greater trochanter with low signal lines within the superior aspect   of the greater trochanter. The marrow edema and low signal does not   extend to the midline of the femur and does not extend to the lesser   trochanter.    There is associated muscle strain of the adductor muscles. There is   moderate to high-grade partial tearing of the gluteus minimus at its   insertion at the greater trochanter and there is a large amount of fluid   within the greater trochanteric bursa.    Patchy bone marrow edema is seen at the inferior posterior aspect of the   posterior sacrum likely compatible with a nondisplaced fracture.    Evaluation of the left hip demonstrates patchy bone marrow edema within   the greater trochanter with a horizontal and obliquely oriented low   signal lines extend to just beyond the midline of the femur towards the   lesser trochanter but without focal intertrochanteric extension. Mild   fluid is seen within the greater trochanteric bursa.    There is partially imaged lower lumbar disc disease.    Moderate bilateral hip osteoarthrosis present.    IMPRESSION: Bilateral greater trochanteric fractures. On the right, the   fracture does not extend to the midline of the proximal femur. On the   left, fracture extends just beyond the midline of the proximal femur   towards the lesser trochanter.    Patchy marrow edema and low signal within the inferior aspect of the   posterior sacrum compatible with a nondisplaced fracture.    High-grade tearing of the right gluteus minimus tendon with adjacent   reactive greater trochanteric bursitis. Right-sided adductor muscle   strain.    --- End of Report ---  CHUNG JUNIOR MD; Attending Radiologist  This document has been electronically signed. Nov 14 2023  8:04AM    Physical Exam:  Mild posterolateral left hip pain with ROM. No groin pain. No pain with heel strike or log roll    Assessment/Plan:  Mariel White is a 89 year old female with a left greater trochanter fracture. Discussed with the patient's son the MRI results of intertrochanteric extension. Discussed the operative and nonoperative management of patient's left hip. Discussed the risks of nonoperative management, including completion of fracture, falls and continued pain. Discussed that operative management would consist of Left Hip Open Reduction and Internal Fixation. Patient's son would like to discuss with his family. All questions answered.    Plan:  -Follow up with patient's son regarding surgical decision
For ORIF L hip.  R/B/A discussed
PT. DVT ppx. f/u labs. f/u medicine. DC planning

## 2023-11-17 NOTE — DISCHARGE NOTE PROVIDER - NSDCCPCAREPLAN_GEN_ALL_CORE_FT
PRINCIPAL DISCHARGE DIAGNOSIS  Diagnosis: Hip fracture  Assessment and Plan of Treatment: you had left hip surgery by Dr. Gutierrez, please follow up with Dr. Jagdeep Gutierrez in 2 weeks      SECONDARY DISCHARGE DIAGNOSES  Diagnosis: HTN (hypertension)  Assessment and Plan of Treatment: you were started on norvasc 5 mg once daily for uncontrolled hypertension, follow up with your primary physician

## 2023-11-17 NOTE — OCCUPATIONAL THERAPY INITIAL EVALUATION ADULT - ADDITIONAL COMMENTS
Patient reports living with  +2 set of 3 steps going to bedroom; however patient has a stair lift. Patient states she was independent in ADLs and functional mobility without devices. Patient requires assistance with IADLs such as food shopping.

## 2023-11-17 NOTE — PROGRESS NOTE ADULT - TIME BILLING
- Review of records, telemetry, vital signs and daily labs.   - General and cardiovascular physical examination.  - Generation of cardiovascular treatment plan.  - Coordination of care.      Patient was seen and examined by me on  11/16/2023,interim events noted,labs and radiology studies reviewed.  Otf Reyes MD,FACC.  [address] [place], [place] [id number].  [phone]
- Review of records, telemetry, vital signs and daily labs.   - General and cardiovascular physical examination.  - Generation of cardiovascular treatment plan.  - Coordination of care.      Patient was seen and examined by me on  11/15/2023,interim events noted,labs and radiology studies reviewed.  Otf Reyes MD,FACC.  [address] [place], [place] [id number].  [phone]
- Review of records, telemetry, vital signs and daily labs.   - General and cardiovascular physical examination.  - Generation of cardiovascular treatment plan.  - Coordination of care.      Patient was seen and examined by me on  11/17/2023,interim events noted,labs and radiology studies reviewed.  Otf Reyes MD,FACC.  [address] [place], [place] [id number].  [phone]
- Review of records, telemetry, vital signs and daily labs.   - General and cardiovascular physical examination.  - Generation of cardiovascular treatment plan.  - Coordination of care.      Patient was seen and examined by me on  11/14/2023,interim events noted,labs and radiology studies reviewed.  Otf Reyes MD,FACC.  [address] [place], [place] [id number].  [phone]

## 2023-11-17 NOTE — PROGRESS NOTE ADULT - SUBJECTIVE AND OBJECTIVE BOX
PATIENT SEEN AND EXAMINED BY JANAY PATEL M.D. ON :- 11/17/23  DATE OF SERVICE:   11/17/23          Interim events noted,Labs ,Radiological studies and Cardiology tests reviewed .  DISCUSSED WITH ACP/MEDICAL RESIDENT ON PLAN OF CARE.    MR#[id number]  PATIENT NAME:[facility] COURSE: HPI:  89 yo f with listed h/o htn, thyroid disease . At time of my exam, pt sleeping, arousable, but does not want to participate in h&p. No one answering phone at  numbers listed in chart. Pt speaking lucidly, opening her eyes on command. Per ed/othro , pt sustained left intertrochanteric fracture. Seen by orthopedics here who upon reviewing CT and MR reading, are recommending bone scan in order to determine if pt requires surgery  (12 Nov 2023 23:45)      INTERIM EVENTS:Patient seen at bedside ,interim events noted.      PMH -reviewed admission note, no change since admission  HEART FAILURE: Acute[ ]Chronic[ ] Systolic[ ] Diastolic[ ] Combined Systolic and Diastolic[ ]  CAD[ ] CABG[ ] PCI[ ]  DEVICES[ ] PPM[ ] ICD[ ] ILR[ ]  ATRIAL FIBRILLATION[ ] Paroxysmal[ ] Permanent[ ] CHADS2-[  ]  VIVIANA[ ] CKD1[ ] CKD2[ ] CKD3[ ] CKD4[ ] ESRD[ ]  COPD[ ] HTN[ ]   DM[ ] Type1[ ] Type 2[ ]   CVA[ ] Paresis[ ]    AMBULATION: Assisted[ ] Cane/walker[ ] Independent[ ]    MEDICATIONS  (STANDING):  amLODIPine   Tablet 5 milliGRAM(s) Oral daily  calamine/zinc oxide Lotion 1 Application(s) Topical three times a day  chlorhexidine 2% Cloths 1 Application(s) Topical daily  enoxaparin Injectable 30 milliGRAM(s) SubCutaneous every 24 hours  influenza  Vaccine (HIGH DOSE) 0.7 milliLiter(s) IntraMuscular once  polyethylene glycol 3350 17 Gram(s) Oral daily  senna 2 Tablet(s) Oral at bedtime  sodium chloride 0.9%. 1000 milliLiter(s) (125 mL/Hr) IV Continuous <Continuous>    MEDICATIONS  (PRN):  acetaminophen     Tablet .. 650 milliGRAM(s) Oral every 6 hours PRN Mild Pain (1 - 3), Moderate Pain (4 - 6)  oxyCODONE    IR 2.5 milliGRAM(s) Oral every 4 hours PRN Moderate Pain (4 - 6)  oxyCODONE    IR 5 milliGRAM(s) Oral every 4 hours PRN Severe Pain (7 - 10)            REVIEW OF SYSTEMS:  Constitutional: [ ] fever, [ ]weight loss,  [ ]fatigue [ ]weight gain  Eyes: [ ] visual changes  Respiratory: [ ]shortness of breath;  [ ] cough, [ ]wheezing, [ ]chills, [ ]hemoptysis  Cardiovascular: [ ] chest pain, [ ]palpitations, [ ]dizziness,  [ ]leg swelling[ ]orthopnea[ ]PND  Gastrointestinal: [ ] abdominal pain, [ ]nausea, [ ]vomiting,  [ ]diarrhea [ ]Constipation [ ]Melena  Genitourinary: [ ] dysuria, [ ] hematuria [ ]Knight  Neurologic: [ ] headaches [ ] tremors[ ]weakness [ ]Paralysis Right[ ] Left[ ]  Skin: [ ] itching, [ ]burning, [ ] rashes  Endocrine: [ ] heat or cold intolerance  Musculoskeletal: [ ] joint pain or swelling; [ ] muscle, back, or extremity pain  Psychiatric: [ ] depression, [ ]anxiety, [ ]mood swings, or [ ]difficulty sleeping  Hematologic: [ ] easy bruising, [ ] bleeding gums    [ ] All remaining systems negative except as per above.   [ ]Unable to obtain.  [x] No change in ROS since admission      Vital Signs Last 24 Hrs  T(C): 36.9 (17 Nov 2023 21:50), Max: 36.9 (17 Nov 2023 21:50)  T(F): 98.4 (17 Nov 2023 21:50), Max: 98.4 (17 Nov 2023 21:50)  HR: 79 (17 Nov 2023 21:50) (76 - 79)  BP: 159/76 (17 Nov 2023 21:50) (107/51 - 159/76)  BP(mean): --  RR: 18 (17 Nov 2023 21:50) (17 - 18)  SpO2: 100% (17 Nov 2023 21:50) (96% - 100%)    Parameters below as of 17 Nov 2023 21:50  Patient On (Oxygen Delivery Method): room air      I&O's Summary    16 Nov 2023 07:01  -  17 Nov 2023 07:00  --------------------------------------------------------  IN: 400 mL / OUT: 0 mL / NET: 400 mL        PHYSICAL EXAM:  General: No acute distress BMI-  HEENT: EOMI, PERRL  Neck: Supple, [ ] JVD  Lungs: Equal air entry bilaterally; [ ] rales [ ] wheezing [ ] rhonchi  Heart: Regular rate and rhythm; [x ] murmur   2/6 [ x] systolic [ ] diastolic [ ] radiation[ ] rubs [ ]  gallops  Abdomen: Nontender, bowel sounds present  Extremities: No clubbing, cyanosis, [ ] edema [ ]Pulses  equal and intact  Nervous system:  Alert & Oriented X3, no focal deficits  Psychiatric: Normal affect  Skin: No rashes or lesions    LABS:  11-17    142  |  104  |  22  ----------------------------<  145<H>  5.1   |  26  |  0.84    Ca    9.3      17 Nov 2023 06:45      Creatinine Trend: 0.84<--, 0.76<--, 0.83<--, 0.88<--, 0.96<--, 1.01<--                        10.8   6.76  )-----------( 236      ( 17 Nov 2023 06:45 )             35.4     PT/INR - ( 16 Nov 2023 05:39 )   PT: 13.7 sec;   INR: 1.22 ratio         PTT - ( 16 Nov 2023 05:39 )  PTT:25.1 sec

## 2023-11-17 NOTE — DISCHARGE NOTE PROVIDER - NSDCFUADDAPPT_GEN_ALL_CORE_FT
Follow up with your primary care provider in 1-2 weeks of discharge.    Follow up with orthopedic surgery on discharge.

## 2023-11-17 NOTE — PHYSICAL THERAPY INITIAL EVALUATION ADULT - PERTINENT HX OF CURRENT PROBLEM, REHAB EVAL
89 yo f with listed h/o htn, thyroid disease . At time of my exam, pt sleeping, arousable, but does not want to participate in h&p. No one answering phone at  numbers listed in chart. Pt speaking lucidly, opening her eyes on command. Per ed/othro , pt sustained left intertrochanteric fracture. Seen by orthopedics here who upon reviewing CT and MR reading, are recommending bone scan in order to determine if pt requires surgery

## 2023-11-17 NOTE — DISCHARGE NOTE PROVIDER - NSFOLLOWUPCLINICS_GEN_ALL_ED_FT
[facility]  Orthopedic Surgery  [address] [address]  Phone: [phone]  Fax:

## 2023-11-17 NOTE — DISCHARGE NOTE PROVIDER - DETAILS OF MALNUTRITION DIAGNOSIS/DIAGNOSES
This patient has been assessed with a concern for Malnutrition and was treated during this hospitalization for the following Nutrition diagnosis/diagnoses:     -  11/16/2023: Severe protein-calorie malnutrition   -  11/16/2023: Underweight (BMI < 19)

## 2023-11-18 ENCOUNTER — NON-APPOINTMENT (OUTPATIENT)
Age: 88
End: 2023-11-18

## 2023-11-18 VITALS
DIASTOLIC BLOOD PRESSURE: 58 MMHG | SYSTOLIC BLOOD PRESSURE: 191 MMHG | OXYGEN SATURATION: 98 % | TEMPERATURE: 98 F | HEART RATE: 73 BPM

## 2023-11-18 LAB
ANION GAP SERPL CALC-SCNC: 10 MMOL/L — SIGNIFICANT CHANGE UP (ref 7–14)
ANION GAP SERPL CALC-SCNC: 10 MMOL/L — SIGNIFICANT CHANGE UP (ref 7–14)
BUN SERPL-MCNC: 29 MG/DL — HIGH (ref 7–23)
BUN SERPL-MCNC: 29 MG/DL — HIGH (ref 7–23)
CALCIUM SERPL-MCNC: 9 MG/DL — SIGNIFICANT CHANGE UP (ref 8.4–10.5)
CALCIUM SERPL-MCNC: 9 MG/DL — SIGNIFICANT CHANGE UP (ref 8.4–10.5)
CHLORIDE SERPL-SCNC: 102 MMOL/L — SIGNIFICANT CHANGE UP (ref 98–107)
CHLORIDE SERPL-SCNC: 102 MMOL/L — SIGNIFICANT CHANGE UP (ref 98–107)
CO2 SERPL-SCNC: 27 MMOL/L — SIGNIFICANT CHANGE UP (ref 22–31)
CO2 SERPL-SCNC: 27 MMOL/L — SIGNIFICANT CHANGE UP (ref 22–31)
CREAT SERPL-MCNC: 0.74 MG/DL — SIGNIFICANT CHANGE UP (ref 0.5–1.3)
CREAT SERPL-MCNC: 0.74 MG/DL — SIGNIFICANT CHANGE UP (ref 0.5–1.3)
EGFR: 74 ML/MIN/1.73M2 — SIGNIFICANT CHANGE UP
EGFR: 74 ML/MIN/1.73M2 — SIGNIFICANT CHANGE UP
GLUCOSE SERPL-MCNC: 75 MG/DL — SIGNIFICANT CHANGE UP (ref 70–99)
GLUCOSE SERPL-MCNC: 75 MG/DL — SIGNIFICANT CHANGE UP (ref 70–99)
HCT VFR BLD CALC: 35.6 % — SIGNIFICANT CHANGE UP (ref 34.5–45)
HCT VFR BLD CALC: 35.6 % — SIGNIFICANT CHANGE UP (ref 34.5–45)
HGB BLD-MCNC: 11 G/DL — LOW (ref 11.5–15.5)
HGB BLD-MCNC: 11 G/DL — LOW (ref 11.5–15.5)
MAGNESIUM SERPL-MCNC: 1.8 MG/DL — SIGNIFICANT CHANGE UP (ref 1.6–2.6)
MAGNESIUM SERPL-MCNC: 1.8 MG/DL — SIGNIFICANT CHANGE UP (ref 1.6–2.6)
MCHC RBC-ENTMCNC: 30.2 PG — SIGNIFICANT CHANGE UP (ref 27–34)
MCHC RBC-ENTMCNC: 30.2 PG — SIGNIFICANT CHANGE UP (ref 27–34)
MCHC RBC-ENTMCNC: 30.9 GM/DL — LOW (ref 32–36)
MCHC RBC-ENTMCNC: 30.9 GM/DL — LOW (ref 32–36)
MCV RBC AUTO: 97.8 FL — SIGNIFICANT CHANGE UP (ref 80–100)
MCV RBC AUTO: 97.8 FL — SIGNIFICANT CHANGE UP (ref 80–100)
NRBC # BLD: 0 /100 WBCS — SIGNIFICANT CHANGE UP (ref 0–0)
NRBC # BLD: 0 /100 WBCS — SIGNIFICANT CHANGE UP (ref 0–0)
NRBC # FLD: 0 K/UL — SIGNIFICANT CHANGE UP (ref 0–0)
NRBC # FLD: 0 K/UL — SIGNIFICANT CHANGE UP (ref 0–0)
PHOSPHATE SERPL-MCNC: 2 MG/DL — LOW (ref 2.5–4.5)
PHOSPHATE SERPL-MCNC: 2 MG/DL — LOW (ref 2.5–4.5)
PLATELET # BLD AUTO: 215 K/UL — SIGNIFICANT CHANGE UP (ref 150–400)
PLATELET # BLD AUTO: 215 K/UL — SIGNIFICANT CHANGE UP (ref 150–400)
POTASSIUM SERPL-MCNC: 4.1 MMOL/L — SIGNIFICANT CHANGE UP (ref 3.5–5.3)
POTASSIUM SERPL-MCNC: 4.1 MMOL/L — SIGNIFICANT CHANGE UP (ref 3.5–5.3)
POTASSIUM SERPL-SCNC: 4.1 MMOL/L — SIGNIFICANT CHANGE UP (ref 3.5–5.3)
POTASSIUM SERPL-SCNC: 4.1 MMOL/L — SIGNIFICANT CHANGE UP (ref 3.5–5.3)
RBC # BLD: 3.64 M/UL — LOW (ref 3.8–5.2)
RBC # BLD: 3.64 M/UL — LOW (ref 3.8–5.2)
RBC # FLD: 13.1 % — SIGNIFICANT CHANGE UP (ref 10.3–14.5)
RBC # FLD: 13.1 % — SIGNIFICANT CHANGE UP (ref 10.3–14.5)
SODIUM SERPL-SCNC: 139 MMOL/L — SIGNIFICANT CHANGE UP (ref 135–145)
SODIUM SERPL-SCNC: 139 MMOL/L — SIGNIFICANT CHANGE UP (ref 135–145)
WBC # BLD: 7.89 K/UL — SIGNIFICANT CHANGE UP (ref 3.8–10.5)
WBC # BLD: 7.89 K/UL — SIGNIFICANT CHANGE UP (ref 3.8–10.5)
WBC # FLD AUTO: 7.89 K/UL — SIGNIFICANT CHANGE UP (ref 3.8–10.5)
WBC # FLD AUTO: 7.89 K/UL — SIGNIFICANT CHANGE UP (ref 3.8–10.5)

## 2023-11-18 PROCEDURE — 99239 HOSP IP/OBS DSCHRG MGMT >30: CPT

## 2023-11-18 RX ORDER — AMLODIPINE BESYLATE 2.5 MG/1
1 TABLET ORAL
Qty: 30 | Refills: 0
Start: 2023-11-18 | End: 2023-12-17

## 2023-11-18 RX ORDER — SENNA PLUS 8.6 MG/1
2 TABLET ORAL
Qty: 0 | Refills: 0 | DISCHARGE
Start: 2023-11-18

## 2023-11-18 RX ORDER — SODIUM,POTASSIUM PHOSPHATES 278-250MG
1 POWDER IN PACKET (EA) ORAL
Refills: 0 | Status: DISCONTINUED | OUTPATIENT
Start: 2023-11-18 | End: 2023-11-18

## 2023-11-18 RX ORDER — CALAMINE AND ZINC OXIDE AND PHENOL 160; 10 MG/ML; MG/ML
1 LOTION TOPICAL
Qty: 0 | Refills: 0 | DISCHARGE
Start: 2023-11-18

## 2023-11-18 RX ORDER — ASPIRIN/CALCIUM CARB/MAGNESIUM 324 MG
1 TABLET ORAL
Refills: 0 | DISCHARGE

## 2023-11-18 RX ORDER — ACETAMINOPHEN 500 MG
2 TABLET ORAL
Qty: 0 | Refills: 0 | DISCHARGE
Start: 2023-11-18

## 2023-11-18 RX ORDER — AMLODIPINE BESYLATE 2.5 MG/1
5 TABLET ORAL ONCE
Refills: 0 | Status: COMPLETED | OUTPATIENT
Start: 2023-11-18 | End: 2023-11-18

## 2023-11-18 RX ORDER — POLYETHYLENE GLYCOL 3350 17 G/17G
17 POWDER, FOR SOLUTION ORAL
Qty: 0 | Refills: 0 | DISCHARGE
Start: 2023-11-18

## 2023-11-18 RX ADMIN — Medication 1 PACKET(S): at 12:35

## 2023-11-18 RX ADMIN — AMLODIPINE BESYLATE 5 MILLIGRAM(S): 2.5 TABLET ORAL at 11:09

## 2023-11-18 RX ADMIN — CHLORHEXIDINE GLUCONATE 1 APPLICATION(S): 213 SOLUTION TOPICAL at 11:12

## 2023-11-18 RX ADMIN — POLYETHYLENE GLYCOL 3350 17 GRAM(S): 17 POWDER, FOR SOLUTION ORAL at 11:09

## 2023-11-18 NOTE — PROVIDER CONTACT NOTE (OTHER) - SITUATION
patient very confused and combative. patient refused all medications and care. unable to place new IV for 2200 ABX. unable to do VS
patient due ABX unable to given. patient IV infiltrate unable to place one.
Pt with elevated BP of 184/90
patient Blood Pressure 173/78

## 2023-11-18 NOTE — PROGRESS NOTE ADULT - PROBLEM SELECTOR PROBLEM 3
Other disorders of thyroid

## 2023-11-18 NOTE — PROGRESS NOTE ADULT - SUBJECTIVE AND OBJECTIVE BOX
Dr. Cathryn Chairez  Pager [phone]    PROGRESS NOTE:     Patient is a 89y old  Female who presents with a chief complaint of hip fx (18 Nov 2023 08:01)      SUBJECTIVE / OVERNIGHT EVENTS: pt denies chest pain or sob, upset and wants to go home  ADDITIONAL REVIEW OF SYSTEMS: afebrile     MEDICATIONS  (STANDING):  amLODIPine   Tablet 5 milliGRAM(s) Oral daily  calamine/zinc oxide Lotion 1 Application(s) Topical three times a day  chlorhexidine 2% Cloths 1 Application(s) Topical daily  enoxaparin Injectable 30 milliGRAM(s) SubCutaneous every 24 hours  influenza  Vaccine (HIGH DOSE) 0.7 milliLiter(s) IntraMuscular once  polyethylene glycol 3350 17 Gram(s) Oral daily  potassium phosphate / sodium phosphate Powder (PHOS-NaK) 1 Packet(s) Oral three times a day with meals  senna 2 Tablet(s) Oral at bedtime    MEDICATIONS  (PRN):  acetaminophen     Tablet .. 650 milliGRAM(s) Oral every 6 hours PRN Mild Pain (1 - 3), Moderate Pain (4 - 6)  oxyCODONE    IR 2.5 milliGRAM(s) Oral every 4 hours PRN Moderate Pain (4 - 6)  oxyCODONE    IR 5 milliGRAM(s) Oral every 4 hours PRN Severe Pain (7 - 10)      CAPILLARY BLOOD GLUCOSE        I&O's Summary      PHYSICAL EXAM:  Vital Signs Last 24 Hrs  T(C): 36.2 (18 Nov 2023 06:20), Max: 36.9 (17 Nov 2023 21:50)  T(F): 97.1 (18 Nov 2023 06:20), Max: 98.4 (17 Nov 2023 21:50)  HR: 78 (18 Nov 2023 06:20) (76 - 79)  BP: 164/76 (18 Nov 2023 10:50) (107/51 - 173/78)  BP(mean): --  RR: 17 (18 Nov 2023 06:20) (17 - 18)  SpO2: 99% (18 Nov 2023 06:20) (97% - 100%)    Parameters below as of 18 Nov 2023 06:20  Patient On (Oxygen Delivery Method): room air      CONSTITUTIONAL: NAD, well-developed  RESPIRATORY: Normal respiratory effort; lungs are clear to auscultation bilaterally  CARDIOVASCULAR: Regular rate and rhythm, normal S1 and S2, no murmur/rub/gallop; No lower extremity edema; Peripheral pulses are 2+ bilaterally  ABDOMEN: Nontender to palpation, normoactive bowel sounds, no rebound/guarding; No hepatosplenomegaly  MUSCULOSKELETAL:  b/l hips have good ROM, L hip wound dressing   PSYCH: A+O to person, place, and time; affect appropriate  Skin: dry flaky skins LE    LABS:                        11.0   7.89  )-----------( 215      ( 18 Nov 2023 06:03 )             35.6     11-18    139  |  102  |  29<H>  ----------------------------<  75  4.1   |  27  |  0.74    Ca    9.0      18 Nov 2023 06:03  Phos  2.0     11-18  Mg     1.80     11-18            Urinalysis Basic - ( 18 Nov 2023 06:03 )    Color: x / Appearance: x / SG: x / pH: x  Gluc: 75 mg/dL / Ketone: x  / Bili: x / Urobili: x   Blood: x / Protein: x / Nitrite: x   Leuk Esterase: x / RBC: x / WBC x   Sq Epi: x / Non Sq Epi: x / Bacteria: x          RADIOLOGY & ADDITIONAL TESTS:  Results Reviewed:   Imaging Personally Reviewed:  Electrocardiogram Personally Reviewed:    COORDINATION OF CARE:  Care Discussed with Consultants/Other Providers [Y/N]:  Prior or Outpatient Records Reviewed [Y/N]:

## 2023-11-18 NOTE — PROGRESS NOTE ADULT - SUBJECTIVE AND OBJECTIVE BOX
ORTHOPAEDIC PROGRESS NOTE    SUBJECTIVE:  Pt seen and examined at bedside this am.  Slightly agitated and asking why she is always woken up.  Otherwise no complaints     OBJECTIVE:  Vital Signs Last 24 Hrs  T(C): 36.2 (18 Nov 2023 06:20), Max: 36.9 (17 Nov 2023 21:50)  T(F): 97.1 (18 Nov 2023 06:20), Max: 98.4 (17 Nov 2023 21:50)  HR: 78 (18 Nov 2023 06:20) (76 - 79)  BP: 173/78 (18 Nov 2023 06:20) (107/51 - 173/78)  BP(mean): --  RR: 17 (18 Nov 2023 06:20) (17 - 18)  SpO2: 99% (18 Nov 2023 06:20) (97% - 100%)    Parameters below as of 18 Nov 2023 06:20  Patient On (Oxygen Delivery Method): room air        Physical Exam:  General: NAD; resting comfrotably in bed  Resp: non labored  LLE:  Aquacel c/d/i  Motor: IP/Quad/Gastroc/Sol/TA/EHL intact   SILT throughout   Compartments soft/non-tender  +DP/PT pulse    LABS                        10.8   6.76  )-----------( 236      ( 17 Nov 2023 06:45 )             35.4       11-17    142  |  104  |  22  ----------------------------<  145<H>  5.1   |  26  |  0.84    Ca    9.3      17 Nov 2023 06:45            I&O's Summary

## 2023-11-18 NOTE — PROGRESS NOTE ADULT - ASSESSMENT
89F s/p L hip DHS    PLAN  Pain control PRN  WBAT LLE  DVT ppx Lovenox  FU labs  PT/OT  dispo planning: home     
89 yo f with left hip fracture following mechanical fall      #Hip fracture.   ·  Plan: -NWB LLE, bedrest   plan for OR tomorrow, NPO past MN  - Overall this patient is at  intermediate risk (for cardiac death, nonfatal myocardial infarction, and nonfatal cardiac arrest perioperatively for this intermediate  risk procedure).     The patient is however without evidence of ACS, Decompensated Heart Failure,Obstructive Valvular Heart disease or Unstable arrhythmia.   There  are  no further recommendation for risk stratifying imaging/stress testing prior to planned surgery        #HTN (hypertension).   ·  Plan: normotensive currently; no pain meds given in ed thus far  
ORTHO PROGRESS NOTE     Pt seen and examined at bedside, denies SOB, CP, Dizziness. N/V/D /HA.  No significant overnight events. Pain well controlled.    Vital Signs Last 24 Hrs  T(C): 36.6 (14 Nov 2023 21:07), Max: 36.8 (14 Nov 2023 13:47)  T(F): 97.9 (14 Nov 2023 21:07), Max: 98.3 (14 Nov 2023 13:47)  HR: 79 (14 Nov 2023 21:07) (69 - 97)  BP: 150/70 (14 Nov 2023 21:07) (119/54 - 153/67)  BP(mean): --  RR: 17 (14 Nov 2023 21:07) (17 - 17)  SpO2: 96% (14 Nov 2023 21:07) (96% - 98%)    Parameters below as of 14 Nov 2023 21:07  Patient On (Oxygen Delivery Method): room air        Gen: NAD, alert and oriented  Resp: Unlabored breathing  LLE: skin intact       SILT DP/SP/ Sandip/Saph/tib       5/5 EHL 5/5 FHL 5/5 TA 5/5 Gastroc 5/5 IP        DP+,        soft compartments, no calf ttp,       Labs:  CBC Full  -  ( 14 Nov 2023 05:24 )  WBC Count : 5.36 K/uL  RBC Count : 3.46 M/uL  Hemoglobin : 10.4 g/dL  Hematocrit : 33.6 %  Platelet Count - Automated : 228 K/uL  Mean Cell Volume : 97.1 fL  Mean Cell Hemoglobin : 30.1 pg  Mean Cell Hemoglobin Concentration : 31.0 gm/dL  Auto Neutrophil # : x  Auto Lymphocyte # : x  Auto Monocyte # : x  Auto Eosinophil # : x  Auto Basophil # : x  Auto Neutrophil % : x  Auto Lymphocyte % : x  Auto Monocyte % : x  Auto Eosinophil % : x  Auto Basophil % : x      11-14    142  |  107  |  32<H>  ----------------------------<  94  4.0   |  28  |  0.96    Ca    8.6      14 Nov 2023 05:24  Phos  2.4     11-14  Mg     1.90     11-14        A/P  Pt is a 89y Female w/ L greater troch fx. MRI L hip read as intertrochanteric extension. Plan today for family discussion with surgeon regarding possible operative management. Likely OR tomorrow if operative management pursued    - Pain control/ Analgesia  - NWB LLE  - FU family discussion re operative management, if desired likely OR tomorrow  - NPO after MN/IVF  - Hold DVT PPX  - Please obtain medical clearance  - FU preop labs
89 yo f with left hip fracture following mechanical fall      #Hip fracture.   ·  Plan: -NWB LLE, bedrest  -Possible OR  - Overall this patient is at  intermediate risk (for cardiac death, nonfatal myocardial infarction, and nonfatal cardiac arrest perioperatively for this intermediate  risk procedure).     The patient is however without evidence of ACS, Decompensated Heart Failure,Obstructive Valvular Heart disease or Unstable arrhythmia.   There  are  no further recommendation for risk stratifying imaging/stress testing prior to planned surgery        #HTN (hypertension).   ·  Plan: normotensive currently; no pain meds given in ed thus far  
Assessment: 89F s/p L hip DHS    Plan:  Pain control PRN  WBAT LLE  DVT ppx Lovenox  FU labs  PT/OT  dispo planning  rest of care per primary team    Shreya Williamson MD  Orthopaedic Surgery Resident    For all questions, please reach out via the following numbers for the on-call resident; do not reach out via Teams.  [facility] [id number]  [facility]        [id number]  [facility]  [phone]/ [phone]  
89 yo f with left hip fracture following mechanical fall      #Hip fracture.   ·  Plan: -NWB LLE, bedrest  -ORIIF fracture of femoral neck fracture with dynamic hip screw today 11/16    #HTN (hypertension).   ·  Plan: normotensive currently; no pain meds given in ed thus far  
89 yo f with left hip fracture following mechanical fall      #Hip fracture.   ·  Plan: -NWB LLE, bedrest  -s/p ORIIF fracture of femoral neck fracture with dynamic hip screw 11/16    #HTN (hypertension).   ·  Plan: normotensive currently; no pain meds given in ed thus far  
89 yo F with h/o HTN, hyperthyroid (not on meds), p/w fall with left hip fracture 
89 yo F with h/o HTN, hyperthyroid (not on meds), p/w fall with left hip fracture 
89 yo F with h/o HTN, hyperthyroid (not on meds), p/w fall with left hip fracture 
89 yo F with h/o HTN, hyperthyroid (not on meds), p/w fall with left hip fracture

## 2023-11-18 NOTE — PROGRESS NOTE ADULT - PROBLEM SELECTOR PLAN 5
son reports she only takes aspirin 81mg, and nothing else, not on meds for hx of HTN or hyperthyroid, previous took meds.

## 2023-11-18 NOTE — PROVIDER CONTACT NOTE (OTHER) - ACTION/TREATMENT ORDERED:
AZUL.P. made aware
TAWANDA made aware. will retry in the AM
ACP Lana Connell made aware. Awaiting orders
P.A. made aware

## 2023-11-18 NOTE — PROGRESS NOTE ADULT - PROBLEM SELECTOR PLAN 3
not on any thyroid meds per son  TSH 0.43, FT4 1.2
not on any thyroid meds per son  TSH 0.43  check FT4 in am
not on any thyroid meds per son  TSH 0.43, FT4 1.2
not on any thyroid meds per son  TSH 0.43, FT4 1.2

## 2023-11-18 NOTE — PROGRESS NOTE ADULT - REASON FOR ADMISSION
hip fx

## 2023-11-18 NOTE — PROGRESS NOTE ADULT - PROVIDER SPECIALTY LIST ADULT
Hospitalist
Orthopedics
Cardiology
Orthopedics
Cardiology
Hospitalist
Cardiology
Cardiology
Hospitalist

## 2023-11-18 NOTE — PROGRESS NOTE ADULT - PROBLEM SELECTOR PLAN 1
-NWB LLE, bedrest  - MRI left hip (11/12) Acute comminuted intertrochanteric left hip fracture.  - Repeat MRI (11/13)  Bilateral greater trochanteric fractures. On the right, the   fracture does not extend to the midline of the proximal femur. On the   left, fracture extends just beyond the midline of the proximal femur   towards the lesser trochanter.  -  s/p L hip ORIF with dynamic hip screw ([facility]) on 11/16, outpt f/u Dr. Jagdeep Gutierrez  - PT/OT recs home PT/OT  - dispo: DC home today with home PT/OT, d/w , f/u CM  Time spent on discharge 31 minutes coordinating discharge plan and discussing with patient and family.

## 2023-11-18 NOTE — PROVIDER CONTACT NOTE (OTHER) - ASSESSMENT
Pt is A&Ox2. Denies chest pain, sob, or hip pain.
patient confused and refused all P.O med
patient very confused agitated  and combative.
very confused

## 2023-11-18 NOTE — CHART NOTE - NSCHARTNOTEFT_GEN_A_CORE
Per discussion with medicine attending, no need to discharge on DVT PPx as patient is ambulatory and risk outweighs benefit in patient with recent fall.

## 2023-11-18 NOTE — PROGRESS NOTE ADULT - NUTRITIONAL ASSESSMENT
This patient has been assessed with a concern for Malnutrition and has been determined to have a diagnosis/diagnoses of Severe protein-calorie malnutrition and Underweight (BMI < 19).    This patient is being managed with:   Diet Regular-  Supplement Feeding Modality:  Oral  Ensure Plus High Protein Cans or Servings Per Day:  1       Frequency:  Daily  Entered: Nov 16 2023 10:47AM  
This patient has been assessed with a concern for Malnutrition and has been determined to have a diagnosis/diagnoses of Severe protein-calorie malnutrition and Underweight (BMI < 19).    This patient is being managed with:   Diet Regular-  Supplement Feeding Modality:  Oral  Ensure Plus High Protein Cans or Servings Per Day:  1       Frequency:  Daily  Entered: Nov 16 2023 10:47AM  
This patient has been assessed with a concern for Malnutrition and has been determined to have a diagnosis/diagnoses of Severe protein-calorie malnutrition and Underweight (BMI < 19).    This patient is being managed with:   Diet NPO after Midnight-     NPO Start Date: 15-Nov-2023   NPO Start Time: 23:59  Entered: Nov 15 2023  7:10AM    Diet DASH/TLC-  Sodium & Cholesterol Restricted  Supplement Feeding Modality:  Oral  Ensure Enlive Cans or Servings Per Day:  1       Frequency:  Two Times a day  Entered: Nov 14 2023 10:44AM    The following pending diet order is being considered for treatment of Severe protein-calorie malnutrition and Underweight (BMI < 19):  Diet Regular-  Supplement Feeding Modality:  Oral  Ensure Plus High Protein Cans or Servings Per Day:  1       Frequency:  Daily  Entered: Nov 16 2023 10:47AM

## 2023-11-18 NOTE — PROGRESS NOTE ADULT - PROBLEM SELECTOR PROBLEM 5
Medication management

## 2023-11-20 PROBLEM — Z00.00 ENCOUNTER FOR PREVENTIVE HEALTH EXAMINATION: Status: ACTIVE | Noted: 2023-11-20

## 2023-11-28 ENCOUNTER — APPOINTMENT (OUTPATIENT)
Dept: ORTHOPEDIC SURGERY | Facility: CLINIC | Age: 88
End: 2023-11-28
Payer: MEDICARE

## 2023-11-28 VITALS — BODY MASS INDEX: 20.96 KG/M2 | WEIGHT: 104 LBS | HEIGHT: 59 IN

## 2023-11-28 PROCEDURE — 99024 POSTOP FOLLOW-UP VISIT: CPT

## 2023-11-28 PROCEDURE — 73502 X-RAY EXAM HIP UNI 2-3 VIEWS: CPT | Mod: LT

## 2024-01-02 ENCOUNTER — APPOINTMENT (OUTPATIENT)
Dept: ORTHOPEDIC SURGERY | Facility: CLINIC | Age: 89
End: 2024-01-02
Payer: MEDICARE

## 2024-01-02 DIAGNOSIS — S72.142D DISPLACED INTERTROCHANTERIC FRACTURE OF LEFT FEMUR, SUBSEQUENT ENCOUNTER FOR CLOSED FRACTURE WITH ROUTINE HEALING: ICD-10-CM

## 2024-01-02 PROCEDURE — 73502 X-RAY EXAM HIP UNI 2-3 VIEWS: CPT

## 2024-01-02 PROCEDURE — 99024 POSTOP FOLLOW-UP VISIT: CPT

## 2024-01-05 PROBLEM — S72.142D CLOSED DISPLACED INTERTROCHANTERIC FRACTURE OF LEFT FEMUR WITH ROUTINE HEALING, SUBSEQUENT ENCOUNTER: Status: ACTIVE | Noted: 2023-11-27

## 2024-03-04 ENCOUNTER — APPOINTMENT (OUTPATIENT)
Dept: ORTHOPEDIC SURGERY | Facility: CLINIC | Age: 89
End: 2024-03-04

## 2024-07-26 NOTE — ED ADULT NURSE NOTE - NS ED NURSE LEVEL OF CONSCIOUSNESS MENTAL STATUS
Continued Stay Note  [facility]     Patient Name: Hazel Bucio  MRN: [id number]  Today's Date: 7/26/2024    Admit Date: 7/23/2024    Plan: [facility], pt will need EMS transport   Discharge Plan       Row Name 07/26/24 1309       Plan    Plan [facility], pt will need EMS transport    Patient/Family in Agreement with Plan yes    Plan Comments CCP s/w Stacey/Mina aCdena who confirmed pt is LTC private pay bed hold and pt can return anytime. Partial packet in [employer], pharmacy updated to [facility] in [employer]. Toño ORTEGA/CCP                   Discharge Codes    No documentation.                 Expected Discharge Date and Time       Expected Discharge Date Expected Discharge Time    Jul 29, 2024               Joseline Hannon RN     Awake/Alert/Cooperative
